# Patient Record
Sex: FEMALE | Race: WHITE | NOT HISPANIC OR LATINO | Employment: FULL TIME | URBAN - METROPOLITAN AREA
[De-identification: names, ages, dates, MRNs, and addresses within clinical notes are randomized per-mention and may not be internally consistent; named-entity substitution may affect disease eponyms.]

---

## 2017-12-27 ENCOUNTER — OFFICE VISIT (OUTPATIENT)
Dept: OBSTETRICS AND GYNECOLOGY | Age: 42
End: 2017-12-27

## 2017-12-27 VITALS
SYSTOLIC BLOOD PRESSURE: 118 MMHG | DIASTOLIC BLOOD PRESSURE: 80 MMHG | BODY MASS INDEX: 24.35 KG/M2 | WEIGHT: 164.4 LBS | HEIGHT: 69 IN

## 2017-12-27 DIAGNOSIS — Z11.51 SCREENING FOR HPV (HUMAN PAPILLOMAVIRUS): ICD-10-CM

## 2017-12-27 DIAGNOSIS — Z01.419 WELL WOMAN EXAM WITH ROUTINE GYNECOLOGICAL EXAM: Primary | ICD-10-CM

## 2017-12-27 PROCEDURE — 99386 PREV VISIT NEW AGE 40-64: CPT | Performed by: OBSTETRICS & GYNECOLOGY

## 2017-12-27 RX ORDER — ACYCLOVIR 800 MG/1
TABLET ORAL
Refills: 2 | COMMUNITY
Start: 2017-09-22 | End: 2019-07-18

## 2017-12-27 RX ORDER — SERTRALINE HYDROCHLORIDE 100 MG/1
100 TABLET, FILM COATED ORAL DAILY
COMMUNITY
End: 2018-09-24

## 2017-12-27 NOTE — PROGRESS NOTES
Chief complaint: annual/New gyn    Subjective   History of Present Illness    Christina Garcia is a 42 y.o.  who presents for annual exam. Moved back to [place] from [place]. Recently ,  has not had children and is interested in pregnancy. Pt not sure as children are grown and she is in her 40's. Currently has Mirena IUD (placed about 3 1/2 yrs ago). Menses q mo, last 5-7 days, fairly light with several days of spotting. Happy w/ IUD. H/o HSV and takes antivirals prn outbreak.  Soc Hx- sisters are my patients, pt works as a  ([employer]). - pharmacist at [employer]    Obstetric History:  OB History      Para Term  AB Living    3 3 3   3    SAB TAB Ectopic Multiple Live Births        3         Menstrual History:  Menarche age: 13 years  Patient's last menstrual period was 2017 (exact date).         Current contraception: IUD  History of abnormal Pap smear: no  Received Gardasil immunization: no  Perform regular self breast exam: yes -    Family history of uterine or ovarian cancer: no  Family History of colon cancer: yes - uncle in late 40's  Family history of breast cancer: no    Mammogram: up to date.  Colonoscopy: recommended.  DEXA: not indicated.    Exercise: moderately active  Calcium/Vitamin D: adequate intake    The following portions of the patient's history were reviewed and updated as appropriate: allergies, current medications, past family history, past medical history, past social history, past surgical history and problem list.    Review of Systems   Constitutional: Negative for activity change, fatigue, fever and unexpected weight change.   Respiratory: Negative for chest tightness and shortness of breath.    Cardiovascular: Negative for chest pain, palpitations and leg swelling.   Gastrointestinal: Negative for abdominal distention, abdominal pain, blood in stool, constipation, diarrhea, nausea and vomiting.   Endocrine: Negative  "for cold intolerance, heat intolerance, polydipsia, polyphagia and polyuria.   Genitourinary: Negative.    Musculoskeletal: Negative for arthralgias and back pain.   Skin: Negative for color change.   Neurological: Negative for weakness and headaches.   Hematological: Does not bruise/bleed easily.   Psychiatric/Behavioral: Negative for confusion.       Pertinent items are noted in HPI.     Objective   Physical Exam    /80  Ht 175.3 cm (69\")  Wt 74.6 kg (164 lb 6.4 oz)  LMP 12/12/2017 (Exact Date)  BMI 24.28 kg/m2    General:   alert, appears stated age and cooperative   Neck: no asymmetry, masses, or scars   Heart: regular rate and rhythm, S1, S2 normal, no murmur, click, rub or gallop   Lungs: clear to auscultation bilaterally   Abdomen: soft, non-tender, without masses or organomegaly   Breast: inspection negative, no nipple discharge or bleeding, no masses or nodularity palpable   Vulva: Bartholin's, Urethra, [place]'s normal   Vagina: normal mucosa   Cervix: multiparous appearance, no bleeding following Pap, no cervical motion tenderness, no lesions and IUD string noted   Uterus: normal size, mobile, non-tender, normal shape and consistency   Adnexa: normal adnexa and no mass, fullness, tenderness   Rectal: not indicated     Assessment/Plan   Christina was seen today for gynecologic exam.    Diagnoses and all orders for this visit:    Well woman exam with routine gynecological exam  -     PapIG, HPV, Rfx 16 / 18 - ThinPrep Vial, Cervix    Screening for HPV (human papillomavirus)  -     PapIG, HPV, Rfx 16 / 18 - ThinPrep Vial, Cervix        Breast self exam technique reviewed and patient encouraged to perform self-exam monthly.  Discussed healthy lifestyle modifications.  Pap smear sent    Discussed risks of pregnancy with AMA (would be 43 at time of delivery if became pregnant now) Discussed AMH testing             "

## 2017-12-29 LAB
CYTOLOGIST CVX/VAG CYTO: NORMAL
CYTOLOGY CVX/VAG DOC THIN PREP: NORMAL
DX ICD CODE: NORMAL
HIV 1 & 2 AB SER-IMP: NORMAL
HPV I/H RISK 1 DNA CVX QL PROBE+SIG AMP: NEGATIVE
OTHER STN SPEC: NORMAL
PATH REPORT.FINAL DX SPEC: NORMAL
STAT OF ADQ CVX/VAG CYTO-IMP: NORMAL

## 2018-01-03 ENCOUNTER — TELEPHONE (OUTPATIENT)
Dept: OBSTETRICS AND GYNECOLOGY | Age: 43
End: 2018-01-03

## 2018-08-11 ENCOUNTER — APPOINTMENT (OUTPATIENT)
Dept: CARDIOLOGY | Facility: HOSPITAL | Age: 43
End: 2018-08-11

## 2018-08-11 ENCOUNTER — APPOINTMENT (OUTPATIENT)
Dept: GENERAL RADIOLOGY | Facility: HOSPITAL | Age: 43
End: 2018-08-11

## 2018-08-11 ENCOUNTER — HOSPITAL ENCOUNTER (EMERGENCY)
Facility: HOSPITAL | Age: 43
Discharge: HOME | End: 2018-08-11
Attending: EMERGENCY MEDICINE | Admitting: EMERGENCY MEDICINE

## 2018-08-11 VITALS
TEMPERATURE: 98 F | BODY MASS INDEX: 24.44 KG/M2 | WEIGHT: 165 LBS | HEART RATE: 57 BPM | RESPIRATION RATE: 16 BRPM | DIASTOLIC BLOOD PRESSURE: 88 MMHG | OXYGEN SATURATION: 98 % | SYSTOLIC BLOOD PRESSURE: 127 MMHG | HEIGHT: 69 IN

## 2018-08-11 DIAGNOSIS — M79.605 LEFT LEG PAIN: Primary | ICD-10-CM

## 2018-08-11 LAB
ANION GAP SERPL CALCULATED.3IONS-SCNC: 10.4 MMOL/L
APTT PPP: 31.6 SECONDS (ref 22.7–35.4)
BASOPHILS # BLD AUTO: 0.02 10*3/MM3 (ref 0–0.2)
BASOPHILS NFR BLD AUTO: 0.3 % (ref 0–1.5)
BH CV LOWER VASCULAR LEFT COMMON FEMORAL AUGMENT: NORMAL
BH CV LOWER VASCULAR LEFT COMMON FEMORAL COMPETENT: NORMAL
BH CV LOWER VASCULAR LEFT COMMON FEMORAL COMPRESS: NORMAL
BH CV LOWER VASCULAR LEFT COMMON FEMORAL PHASIC: NORMAL
BH CV LOWER VASCULAR LEFT COMMON FEMORAL SPONT: NORMAL
BH CV LOWER VASCULAR LEFT DISTAL FEMORAL COMPRESS: NORMAL
BH CV LOWER VASCULAR LEFT GASTRONEMIUS COMPRESS: NORMAL
BH CV LOWER VASCULAR LEFT GREATER SAPH AK COMPRESS: NORMAL
BH CV LOWER VASCULAR LEFT GREATER SAPH BK COMPRESS: NORMAL
BH CV LOWER VASCULAR LEFT LESSER SAPH COMPRESS: NORMAL
BH CV LOWER VASCULAR LEFT MID FEMORAL AUGMENT: NORMAL
BH CV LOWER VASCULAR LEFT MID FEMORAL COMPETENT: NORMAL
BH CV LOWER VASCULAR LEFT MID FEMORAL COMPRESS: NORMAL
BH CV LOWER VASCULAR LEFT MID FEMORAL PHASIC: NORMAL
BH CV LOWER VASCULAR LEFT MID FEMORAL SPONT: NORMAL
BH CV LOWER VASCULAR LEFT PERONEAL COMPRESS: NORMAL
BH CV LOWER VASCULAR LEFT POPLITEAL AUGMENT: NORMAL
BH CV LOWER VASCULAR LEFT POPLITEAL COMPETENT: NORMAL
BH CV LOWER VASCULAR LEFT POPLITEAL COMPRESS: NORMAL
BH CV LOWER VASCULAR LEFT POPLITEAL PHASIC: NORMAL
BH CV LOWER VASCULAR LEFT POPLITEAL SPONT: NORMAL
BH CV LOWER VASCULAR LEFT POSTERIOR TIBIAL COMPRESS: NORMAL
BH CV LOWER VASCULAR LEFT PROXIMAL FEMORAL COMPRESS: NORMAL
BH CV LOWER VASCULAR LEFT SAPHENOFEMORAL JUNCTION AUGMENT: NORMAL
BH CV LOWER VASCULAR LEFT SAPHENOFEMORAL JUNCTION COMPETENT: NORMAL
BH CV LOWER VASCULAR LEFT SAPHENOFEMORAL JUNCTION COMPRESS: NORMAL
BH CV LOWER VASCULAR LEFT SAPHENOFEMORAL JUNCTION PHASIC: NORMAL
BH CV LOWER VASCULAR LEFT SAPHENOFEMORAL JUNCTION SPONT: NORMAL
BH CV LOWER VASCULAR RIGHT COMMON FEMORAL AUGMENT: NORMAL
BH CV LOWER VASCULAR RIGHT COMMON FEMORAL COMPETENT: NORMAL
BH CV LOWER VASCULAR RIGHT COMMON FEMORAL COMPRESS: NORMAL
BH CV LOWER VASCULAR RIGHT COMMON FEMORAL PHASIC: NORMAL
BH CV LOWER VASCULAR RIGHT COMMON FEMORAL SPONT: NORMAL
BUN BLD-MCNC: 18 MG/DL (ref 6–20)
BUN/CREAT SERPL: 22.8 (ref 7–25)
CALCIUM SPEC-SCNC: 10.1 MG/DL (ref 8.6–10.5)
CHLORIDE SERPL-SCNC: 102 MMOL/L (ref 98–107)
CK SERPL-CCNC: 77 U/L (ref 20–180)
CO2 SERPL-SCNC: 27.6 MMOL/L (ref 22–29)
CREAT BLD-MCNC: 0.79 MG/DL (ref 0.57–1)
DEPRECATED RDW RBC AUTO: 44.2 FL (ref 37–54)
EOSINOPHIL # BLD AUTO: 0.1 10*3/MM3 (ref 0–0.7)
EOSINOPHIL NFR BLD AUTO: 1.4 % (ref 0.3–6.2)
ERYTHROCYTE [DISTWIDTH] IN BLOOD BY AUTOMATED COUNT: 13.5 % (ref 11.7–13)
GFR SERPL CREATININE-BSD FRML MDRD: 79 ML/MIN/1.73
GLUCOSE BLD-MCNC: 90 MG/DL (ref 65–99)
HCT VFR BLD AUTO: 41.5 % (ref 35.6–45.5)
HGB BLD-MCNC: 13.6 G/DL (ref 11.9–15.5)
IMM GRANULOCYTES # BLD: 0.01 10*3/MM3 (ref 0–0.03)
IMM GRANULOCYTES NFR BLD: 0.1 % (ref 0–0.5)
INR PPP: 1.06 (ref 0.9–1.1)
LYMPHOCYTES # BLD AUTO: 2.25 10*3/MM3 (ref 0.9–4.8)
LYMPHOCYTES NFR BLD AUTO: 32.1 % (ref 19.6–45.3)
MCH RBC QN AUTO: 29.6 PG (ref 26.9–32)
MCHC RBC AUTO-ENTMCNC: 32.8 G/DL (ref 32.4–36.3)
MCV RBC AUTO: 90.2 FL (ref 80.5–98.2)
MONOCYTES # BLD AUTO: 0.46 10*3/MM3 (ref 0.2–1.2)
MONOCYTES NFR BLD AUTO: 6.6 % (ref 5–12)
NEUTROPHILS # BLD AUTO: 4.19 10*3/MM3 (ref 1.9–8.1)
NEUTROPHILS NFR BLD AUTO: 59.6 % (ref 42.7–76)
PLATELET # BLD AUTO: 206 10*3/MM3 (ref 140–500)
PMV BLD AUTO: 10.7 FL (ref 6–12)
POTASSIUM BLD-SCNC: 4.2 MMOL/L (ref 3.5–5.2)
PROTHROMBIN TIME: 13.6 SECONDS (ref 11.7–14.2)
RBC # BLD AUTO: 4.6 10*6/MM3 (ref 3.9–5.2)
SODIUM BLD-SCNC: 140 MMOL/L (ref 136–145)
WBC NRBC COR # BLD: 7.02 10*3/MM3 (ref 4.5–10.7)

## 2018-08-11 PROCEDURE — 82550 ASSAY OF CK (CPK): CPT | Performed by: PHYSICIAN ASSISTANT

## 2018-08-11 PROCEDURE — 99284 EMERGENCY DEPT VISIT MOD MDM: CPT

## 2018-08-11 PROCEDURE — 85025 COMPLETE CBC W/AUTO DIFF WBC: CPT | Performed by: EMERGENCY MEDICINE

## 2018-08-11 PROCEDURE — 85610 PROTHROMBIN TIME: CPT | Performed by: EMERGENCY MEDICINE

## 2018-08-11 PROCEDURE — 93971 EXTREMITY STUDY: CPT

## 2018-08-11 PROCEDURE — 80048 BASIC METABOLIC PNL TOTAL CA: CPT | Performed by: EMERGENCY MEDICINE

## 2018-08-11 PROCEDURE — 73552 X-RAY EXAM OF FEMUR 2/>: CPT

## 2018-08-11 PROCEDURE — 85730 THROMBOPLASTIN TIME PARTIAL: CPT | Performed by: EMERGENCY MEDICINE

## 2018-08-11 RX ORDER — NAPROXEN SODIUM 550 MG/1
550 TABLET ORAL 2 TIMES DAILY WITH MEALS
Qty: 20 TABLET | Refills: 0 | Status: SHIPPED | OUTPATIENT
Start: 2018-08-11 | End: 2019-09-11

## 2018-08-11 NOTE — ED NOTES
Pt states that she feels ok to discharge. Pt AA0x3, ambulatory to waiting room with ABCs intact. Steady gait. Prescriptions in hand.      Mirna Gonzalez RN  08/11/18 8817

## 2018-08-11 NOTE — ED NOTES
"PATIENT STATES \"PAIN STARTED 2 DAYS AGO, GOT WORSE YESTERDAY, UNEXPLAINED BRUISING TODAY\"     Deana Villalpando  08/11/18 [id number]    "

## 2018-08-11 NOTE — ED PROVIDER NOTES
EMERGENCY DEPARTMENT ENCOUNTER    Room Number:  25/25  Date seen:  8/12/2018  Time seen: 4:42 PM  PCP: System, Provider Not In  Historian: patient, family  History Limited By: N/A      HPI:  Chief Complaint: lower extremity pain  Context: Christina Garcia is a 43 y.o. female who states that she exercises on a regular basis and last exercised about 4 days ago (spin class). About 3 days ago, while she was going on a walk, she had onset of sharp and throbbing LLE pain from the left hip to the left knee. It is intermittent and is exacerbated by bearing weight on LLE and walking for an extended period. Today, she has noticed LLE swelling and unexplained LLE bruising. She notes that she has chronic lower back problems but her current sx do not seem similar to it. She denies recent known injury or trauma, new back pain, other extremity involvement, chest pain, dyspnea, focal weakness, numbness, trouble with speech, vision changes, headache, saddle anesthesia, abd pain, N/V/D, pain and difficulty with urination, bladder dysfunction, bowel dysfunction, fevers, chills, recent medication changes, and hx of clotting disorder. Pt has no other complaints at this time.     Location: LLE (from left hip to left knee)  Radiation: from left hip to left knee  Quality: sharp, throbbing  Intensity/Severity: moderate  Duration: started about 3 days ago  Onset quality: abrupt  Timing: intermittent  Progression: worse  Aggravating Factors: bearing weight on LLE, walking for prolonged period   Alleviating Factors: keeping weight off of LLE  Previous Episodes: none  Treatment before arrival: none mentioned  Associated Symptoms: LLE swelling, LLE bruising         PAST MEDICAL HISTORY  Active Ambulatory Problems     Diagnosis Date Noted   • No Active Ambulatory Problems     Resolved Ambulatory Problems     Diagnosis Date Noted   • No Resolved Ambulatory Problems     Past Medical History:   Diagnosis Date   • Anxiety    • Herpes          PAST  SURGICAL HISTORY  History reviewed. No pertinent surgical history.      FAMILY HISTORY  Family History   Problem Relation Age of Onset   • Colon cancer Paternal Uncle    • Breast cancer Neg Hx    • Ovarian cancer Neg Hx    • Uterine cancer Neg Hx          SOCIAL HISTORY  Social History     Social History   • Marital status:      Spouse name: N/A   • Number of children: N/A   • Years of education: N/A     Occupational History   • Not on file.     Social History Main Topics   • Smoking status: Former Smoker   • Smokeless tobacco: Never Used   • Alcohol use Yes      Comment: 0-2 drinks per week   • Drug use: No   • Sexual activity: Not on file     Other Topics Concern   • Not on file     Social History Narrative   • No narrative on file         ALLERGIES  Codeine      REVIEW OF SYSTEMS  Review of Systems   Constitutional: Negative for chills and fever.   HENT: Negative for congestion, rhinorrhea and sore throat.    Eyes: Negative for pain.   Respiratory: Negative for cough and shortness of breath.    Cardiovascular: Negative for chest pain and palpitations.   Gastrointestinal: Negative for abdominal pain, diarrhea, nausea and vomiting.   Endocrine: Negative.    Genitourinary: Negative for difficulty urinating and dysuria.   Musculoskeletal: Negative for myalgias. Back pain: chronic lower back problems, no new back pain.        LLE pain from left hip to left knee, LLE swelling   Skin: Positive for color change (LLE bruising).   Neurological: Negative for speech difficulty, weakness, numbness and headaches.   Psychiatric/Behavioral: Negative.    All other systems reviewed and are negative.           PHYSICAL EXAM  ED Triage Vitals   Temp Heart Rate Resp BP SpO2   08/11/18 1343 08/11/18 1343 08/11/18 1343 08/11/18 1412 08/11/18 1343   98.2 °F (36.8 °C) 59 16 127/82 100 % WNL      Temp src Heart Rate Source Patient Position BP Location FiO2 (%)   -- 08/11/18 1523 08/11/18 1412 08/11/18 1523 --    Monitor Sitting  Right arm        Physical Exam   Constitutional: She is oriented to person, place, and time. No distress.   HENT:   Head: Normocephalic.   Mouth/Throat: Mucous membranes are normal.   Eyes: Pupils are equal, round, and reactive to light. EOM are normal.   Neck: Normal range of motion. Neck supple.   Cardiovascular: Normal rate, regular rhythm and normal heart sounds.    Pulses:       Popliteal pulses are 2+ on the left side.        Dorsalis pedis pulses are 2+ on the left side.        Posterior tibial pulses are 2+ on the left side.   Pulmonary/Chest: Effort normal and breath sounds normal. No respiratory distress. She has no decreased breath sounds. She has no wheezes. She has no rhonchi. She has no rales.   Abdominal: Soft. There is no tenderness. There is no rebound and no guarding.   Musculoskeletal: Normal range of motion.   Questionable swelling to LLE compared to the RLE, no LLE tenderness, negative straight leg raise bilaterally, no l-spine tenderness, NV intact distally to LLE   Neurological: She is alert and oriented to person, place, and time. She has normal motor skills and normal sensation.   Sensation and motor function intact to LLE, nonfocal neuro exam   Skin: Skin is warm and dry. Bruising (3 bruises at different stages of healing to the left upper leg ) noted.   Psychiatric: Mood and affect normal.   Nursing note and vitals reviewed.          LAB RESULTS  Recent Results (from the past 24 hour(s))   Duplex Venous Lower Extremity - Left    Collection Time: 08/11/18  3:59 PM   Result Value Ref Range    Right Common Femoral Spont Y     Right Common Femoral Phasic Y     Right Common Femoral Augment Y     Right Common Femoral Competent Y     Right Common Femoral Compress C     Left Common Femoral Spont Y     Left Common Femoral Phasic Y     Left Common Femoral Augment Y     Left Common Femoral Competent Y     Left Common Femoral Compress C     Left Saphenofemoral Junction Spont Y     Left Saphenofemoral  Junction Phasic Y     Left Saphenofemoral [place] Augment Y     Left Saphenofemoral [place] Competent Y     Left Saphenofemoral [place] Compress C     Left Proximal Femoral Compress C     Left Mid Femoral Spont Y     Left Mid Femoral Phasic Y     Left Mid Femoral Augment Y     Left Mid Femoral Competent Y     Left Mid Femoral Compress C     Left Distal Femoral Compress C     Left Popliteal Spont Y     Left Popliteal Phasic Y     Left Popliteal Augment Y     Left Popliteal Competent Y     Left Popliteal Compress C     Left Posterior Tibial Compress C     Left Peroneal Compress C     Left GastronemiusSoleal Compress C     Left Greater Saph AK Compress C     Left Greater Saph BK Compress C     Left Lesser Saph Compress C    Protime-INR    Collection Time: 08/11/18  5:00 PM   Result Value Ref Range    Protime 13.6 11.7 - 14.2 Seconds    INR 1.06 0.90 - 1.10   aPTT    Collection Time: 08/11/18  5:00 PM   Result Value Ref Range    PTT 31.6 22.7 - 35.4 seconds   Basic Metabolic Panel    Collection Time: 08/11/18  5:00 PM   Result Value Ref Range    Glucose 90 65 - 99 mg/dL    BUN 18 6 - 20 mg/dL    Creatinine 0.79 0.57 - 1.00 mg/dL    Sodium 140 136 - 145 mmol/L    Potassium 4.2 3.5 - 5.2 mmol/L    Chloride 102 98 - 107 mmol/L    CO2 27.6 22.0 - 29.0 mmol/L    Calcium 10.1 8.6 - 10.5 mg/dL    eGFR Non African Amer 79 >60 mL/min/1.73    BUN/Creatinine Ratio 22.8 7.0 - 25.0    Anion Gap 10.4 mmol/L   CBC Auto Differential    Collection Time: 08/11/18  5:00 PM   Result Value Ref Range    WBC 7.02 4.50 - 10.70 10*3/mm3    RBC 4.60 3.90 - 5.20 10*6/mm3    Hemoglobin 13.6 11.9 - 15.5 g/dL    Hematocrit 41.5 35.6 - 45.5 %    MCV 90.2 80.5 - 98.2 fL    MCH 29.6 26.9 - 32.0 pg    MCHC 32.8 32.4 - 36.3 g/dL    RDW 13.5 (H) 11.7 - 13.0 %    RDW-SD 44.2 37.0 - 54.0 fl    MPV 10.7 6.0 - 12.0 fL    Platelets 206 140 - 500 10*3/mm3    Neutrophil % 59.6 42.7 - 76.0 %    Lymphocyte % 32.1 19.6 - 45.3 %    Monocyte % 6.6 5.0 - 12.0 %     Eosinophil % 1.4 0.3 - 6.2 %    Basophil % 0.3 0.0 - 1.5 %    Immature Grans % 0.1 0.0 - 0.5 %    Neutrophils, Absolute 4.19 1.90 - 8.10 10*3/mm3    Lymphocytes, Absolute 2.25 0.90 - 4.80 10*3/mm3    Monocytes, Absolute 0.46 0.20 - 1.20 10*3/mm3    Eosinophils, Absolute 0.10 0.00 - 0.70 10*3/mm3    Basophils, Absolute 0.02 0.00 - 0.20 10*3/mm3    Immature Grans, Absolute 0.01 0.00 - 0.03 10*3/mm3   CK    Collection Time: 08/11/18  5:00 PM   Result Value Ref Range    Creatine Kinase 77 20 - 180 U/L     LLE venous duplex (independently viewed by me, interpreted by vascular tech)- negative, no DVT    Ordered the above labs and reviewed the results.        RADIOLOGY  XR Femur 2 View Left (Final result)   Result time 08/11/18 18:43:05 (independently viewed by me, interpreted by radiologist)    Final result by Tacho Lang MD (08/11/18 18:43:05)                Narrative:    LEFT FEMUR X-RAYS     CLINICAL HISTORY: Left flank pain     5 views of the left femur were obtained. No bony abnormalities are  identified. There is no evidence of recent or old fracture or  subluxation. Hip joint appears normal. The knee joint is also  unremarkable.     This report was finalized on 8/11/2018 6:43 PM by Dr. Tacho Lang M.D.                       Ordered the above noted radiological studies. Reviewed by me in PACS.           PROCEDURES  Procedures          PROGRESS AND CONSULTS  ED Course as of Aug 12 0023   Sat Aug 11, 2018   1404 Left leg pain from groin to beyond knee.  Started 2 days ago.  She has several small bruises behind left knee.  Denies recent injury.  Did walk about 3 miles a few evenings ago but states this is usual for her.   [DB]      ED Course User Index  [DB] Tacho Novak MD     1645- LLE venous duplex has already been ordered for further evaluation. Ordered CK, blood work, and PT with INR for further evaluation.     1647- Discussed with pt and family about negative LLE venous duplex and plan to further  evaluate pt's sx by obtaining additional labs. Pt and family verbalize understanding and agreement with plan.     1753- Discussed case with Dr Perez who agrees with the plan of care.     1755- Rechecked pt. She is resting comfortably and is in no acute distress. Discussed with pt and family about normal WBC count, normal platelet count, normal CK, normal PTT, normal PT with INR, and otherwise stable labs. Informed pt that her pain could possibly be musculoskeletal in nature. Offered to perform left femur xray to rule out pathologic bony injury and pt accepts.     1815- Ordered left femur xray to rule out pathologic bony injury.     1900- Rechecked pt. She remains in no acute distress. Discussed with pt and family about left femur xray findings (no bony abnormality). Informed pt of plan to prescribe NSAID for pain. Advised pt to apply ice to areas of pain and to avoid exercising until her sx resolve. Instructed to f/u closely with PMD for recheck and for further testing/treatment as needed. RTER warnings given. Pt understands and agrees with plan. Addressed all questions.        MEDICAL DECISION MAKING      MDM  Number of Diagnoses or Management Options  Left leg pain:      Amount and/or Complexity of Data Reviewed  Clinical lab tests: ordered and reviewed (LLE venous duplex- negative, no DVT    WBC= 7.02, platelet count= 206, CK= 77, INR= 1.06, PTT= 31.6, BUN= 18, creatinine= 0.79)  Tests in the radiology section of CPT®: reviewed and ordered (Left femur xray- no bony abnormality )  Independent visualization of images, tracings, or specimens: yes    Patient Progress  Patient progress: stable             DIAGNOSIS  Final diagnoses:   Left leg pain         DISPOSITION  DISCHARGE    Patient discharged in stable condition.    Reviewed implications of results, diagnosis, meds, responsibility to follow up, warning signs and symptoms of possible worsening, potential complications and reasons to return to  ER.    Patient/Family voiced understanding of above instructions.    Discussed plan for discharge, as there is no emergent indication for admission.  Pt/family is agreeable and understands need for follow up and repeat testing.  Pt is aware that discharge does not mean that nothing is wrong but it indicates no emergency is present and they must continue care with follow-up as given below or physician of their choice.     FOLLOW-UP  Ana Cristina Ridley MD  [address] [id number] [phone]    In 5 days  For further evaluation and treatment if not better      DISCHARGE MEDICATIONS     Medication List      New Prescriptions    naproxen sodium 550 MG tablet  Commonly known as:  ANAPROX  Take 1 tablet by mouth 2 (Two) Times a Day With Meals.            Latest Documented Vital Signs:  As of 7:02 PM  BP- 136/91 HR- 56 Temp- 98.2 °F (36.8 °C) O2 sat- 98%      --  Documentation assistance provided by nikole Johnson for SUAD Dowd.  Information recorded by the scribe was done at my direction and has been verified and validated by me.       Mejia Johnson  08/11/18 1924       Chapo Dowd III, PA  08/12/18 0023     no

## 2018-08-11 NOTE — ED PROVIDER NOTES
Pt presents to the ED c/o posterior LLE pain that she describes a throb. Shortly after, she noticed a bruise to the area. Pain is exacerbated w/ walking but is able to ambulate on the extremity. Pt works out routinely but has not recently due to pain. She denies CP, SOA, and leg swelling. On exam, Pt is awake and alert in no acute distress, RRR w/o murmur, CTAB, abd soft non-tender, non-distended, no midline tenderness, tenderness to the lateral upper left thigh, proximal left thigh has a healing bruise, healing bruise to popliteal fossa, tenderness over the hamstrings distally, pain in area is exacerbated w/ SLR of LLE.    LAB RESULTS AND RADIOLOGY  I have reviewed the patient's labs and imaging studies.    PROCEDURE    PROGRESS NOTES  1800  Spoke to midlevel provider Chapo Dowd PA-C, about the pt.     1812  After performing my own physical exam, I agree w/ the plan of care.    Attestation:    The FRANCI and I have discussed this patient's history, physical exam, and treatment plan.  I have reviewed the documentation and personally had a face to face interaction with the patient. I affirm the documentation and agree with the treatment and plan.  The attached note describes my personal findings.    Documentation assistance provided by nikole Corona for Dr. Perez. Information recorded by the nikole was done at my direction and has been verified and validated by me.     Mary Corona  08/11/18 1813       Samuel Perez MD  08/11/18 7298

## 2018-08-11 NOTE — DISCHARGE INSTRUCTIONS
Return to the ER with any further concerns, should your condition change/worsen, or should you develop a fever.  Avoid working out until symptoms resolve.

## 2018-09-24 ENCOUNTER — TELEPHONE (OUTPATIENT)
Dept: OBSTETRICS AND GYNECOLOGY | Age: 43
End: 2018-09-24

## 2018-09-24 DIAGNOSIS — F41.3 OTHER MIXED ANXIETY DISORDERS: ICD-10-CM

## 2018-09-24 RX ORDER — SERTRALINE HYDROCHLORIDE 100 MG/1
200 TABLET, FILM COATED ORAL DAILY
Qty: 180 TABLET | Refills: 0 | Status: SHIPPED | OUTPATIENT
Start: 2018-09-24 | End: 2018-10-08 | Stop reason: SDUPTHER

## 2018-09-24 NOTE — TELEPHONE ENCOUNTER
Dr Stokes pt move here from [facility], cannot get in with pcp til October the 8th, needs a month of Zoloft 100 mg BID sent in [facility] able to see pcp, the pcp in [facility] will not call in for her.

## 2019-07-18 ENCOUNTER — PROCEDURE VISIT (OUTPATIENT)
Dept: OBSTETRICS AND GYNECOLOGY | Age: 44
End: 2019-07-18

## 2019-07-18 ENCOUNTER — APPOINTMENT (OUTPATIENT)
Dept: WOMENS IMAGING | Facility: HOSPITAL | Age: 44
End: 2019-07-18

## 2019-07-18 ENCOUNTER — OFFICE VISIT (OUTPATIENT)
Dept: OBSTETRICS AND GYNECOLOGY | Age: 44
End: 2019-07-18

## 2019-07-18 VITALS
DIASTOLIC BLOOD PRESSURE: 60 MMHG | SYSTOLIC BLOOD PRESSURE: 100 MMHG | WEIGHT: 169 LBS | HEIGHT: 69 IN | BODY MASS INDEX: 25.03 KG/M2

## 2019-07-18 DIAGNOSIS — Z97.5 IUD CONTRACEPTION: ICD-10-CM

## 2019-07-18 DIAGNOSIS — B00.9 HSV (HERPES SIMPLEX VIRUS) INFECTION: ICD-10-CM

## 2019-07-18 DIAGNOSIS — Z01.419 WELL WOMAN EXAM WITH ROUTINE GYNECOLOGICAL EXAM: Primary | ICD-10-CM

## 2019-07-18 DIAGNOSIS — Z12.31 VISIT FOR SCREENING MAMMOGRAM: Primary | ICD-10-CM

## 2019-07-18 PROCEDURE — 77067 SCR MAMMO BI INCL CAD: CPT | Performed by: RADIOLOGY

## 2019-07-18 PROCEDURE — 99396 PREV VISIT EST AGE 40-64: CPT | Performed by: OBSTETRICS & GYNECOLOGY

## 2019-07-18 PROCEDURE — 77067 SCR MAMMO BI INCL CAD: CPT | Performed by: OBSTETRICS & GYNECOLOGY

## 2019-07-18 RX ORDER — MISOPROSTOL 200 UG/1
TABLET ORAL
Qty: 2 TABLET | Refills: 0 | Status: SHIPPED | OUTPATIENT
Start: 2019-07-18 | End: 2020-09-11

## 2019-07-18 RX ORDER — VALACYCLOVIR HYDROCHLORIDE 500 MG/1
TABLET, FILM COATED ORAL
Qty: 18 TABLET | Refills: 0 | Status: SHIPPED | OUTPATIENT
Start: 2019-07-18 | End: 2022-02-28

## 2019-07-18 NOTE — PROGRESS NOTES
Chief complaint: annual    Subjective   History of Present Illness    Christina Garcia is a 44 y.o.  who presents for annual exam.  Her menses are irregular with Mirena. Has had Mirena for about 6 years. Was placed in [place]. Desires removal and re-insertion. Has rare outbreaks of HSV. Takes acyclovir prn but will try valtrex again.   2017 pap normal, HPV-  Soc Hx-   Needs MMG today    Obstetric History:  OB History      Para Term  AB Living    3 3 3     3    SAB TAB Ectopic Molar Multiple Live Births              3         Menstrual History:     Patient's last menstrual period was 2019 (exact date).         Current contraception: IUD  History of abnormal Pap smear: no  Received Gardasil immunization: no  Perform regular self breast exam: yes -    Family history of uterine or ovarian cancer: no  Family History of colon cancer: yes - GF  Family history of breast cancer: no    Mammogram: done today.  Colonoscopy: recommended. Next year (age 45)  DEXA: not indicated.    Exercise: moderately active  Calcium/Vitamin D: adequate intake    The following portions of the patient's history were reviewed and updated as appropriate: allergies, current medications, past family history, past medical history, past social history, past surgical history and problem list.    Review of Systems   Constitutional: Negative for activity change, fatigue, fever and unexpected weight change.   Respiratory: Negative for chest tightness and shortness of breath.    Cardiovascular: Negative for chest pain, palpitations and leg swelling.   Gastrointestinal: Negative for abdominal distention, abdominal pain, blood in stool, constipation, diarrhea, nausea and vomiting.   Endocrine: Negative for cold intolerance, heat intolerance, polydipsia, polyphagia and polyuria.   Genitourinary: Positive for menstrual problem.   Musculoskeletal: Negative for arthralgias and back pain.   Skin: Negative for color  "change.   Neurological: Negative for weakness and headaches.   Hematological: Does not bruise/bleed easily.   Psychiatric/Behavioral: Negative for confusion.       Pertinent items are noted in HPI.     Objective   Physical Exam    /60   Ht 175.3 cm (69\")   Wt 76.7 kg (169 lb)   LMP 07/07/2019 (Exact Date)   Breastfeeding? No   BMI 24.96 kg/m²     General:   alert, appears stated age and cooperative   Neck: no asymmetry, masses, or scars   Heart: regular rate and rhythm, S1, S2 normal, no murmur, click, rub or gallop   Lungs: clear to auscultation bilaterally   Abdomen: soft, non-tender, without masses or organomegaly   Breast: inspection negative, no nipple discharge or bleeding, no masses or nodularity palpable   Vulva: Bartholin's, Urethra, [place]'s normal   Vagina: normal mucosa   Cervix: multiparous appearance and no cervical motion tenderness, IUD string noted   Uterus: normal size, anteverted, mobile, non-tender, normal shape and consistency   Adnexa: normal adnexa and no mass, fullness, tenderness   Rectal: not indicated     Assessment/Plan   Christina was seen today for gynecologic exam.    Diagnoses and all orders for this visit:    Well woman exam with routine gynecological exam    IUD contraception  -     misoprostol (CYTOTEC) 200 MCG tablet; 2 tab po night before IUD insertion    HSV (herpes simplex virus) infection  -     valACYclovir (VALTREX) 500 MG tablet; 1 po bid for 3 days prn outbreaks    pre-treat w/ cytotec the night before IUD placement as may not be on menses  Will order MIrena today and schedule removal and replacement    Breast self exam technique reviewed and patient encouraged to perform self-exam monthly.  Discussed healthy lifestyle modifications.  Pap smear up to date, plan q 3 yrs                 "

## 2019-09-11 ENCOUNTER — PROCEDURE VISIT (OUTPATIENT)
Dept: OBSTETRICS AND GYNECOLOGY | Age: 44
End: 2019-09-11

## 2019-09-11 ENCOUNTER — OFFICE VISIT (OUTPATIENT)
Dept: OBSTETRICS AND GYNECOLOGY | Age: 44
End: 2019-09-11

## 2019-09-11 VITALS
WEIGHT: 163.2 LBS | SYSTOLIC BLOOD PRESSURE: 126 MMHG | DIASTOLIC BLOOD PRESSURE: 82 MMHG | HEIGHT: 69 IN | BODY MASS INDEX: 24.17 KG/M2

## 2019-09-11 DIAGNOSIS — Z30.430 ENCOUNTER FOR IUD INSERTION: Primary | ICD-10-CM

## 2019-09-11 DIAGNOSIS — Z01.812 PRE-PROCEDURE LAB EXAM: ICD-10-CM

## 2019-09-11 PROCEDURE — 58301 REMOVE INTRAUTERINE DEVICE: CPT | Performed by: OBSTETRICS & GYNECOLOGY

## 2019-09-11 PROCEDURE — 58300 INSERT INTRAUTERINE DEVICE: CPT | Performed by: OBSTETRICS & GYNECOLOGY

## 2019-09-11 PROCEDURE — 76830 TRANSVAGINAL US NON-OB: CPT | Performed by: OBSTETRICS & GYNECOLOGY

## 2019-09-11 PROCEDURE — 81025 URINE PREGNANCY TEST: CPT | Performed by: OBSTETRICS & GYNECOLOGY

## 2019-09-11 RX ORDER — ACYCLOVIR 800 MG/1
TABLET ORAL
COMMUNITY
Start: 2017-06-26 | End: 2020-09-11 | Stop reason: SDUPTHER

## 2019-09-12 LAB
B-HCG UR QL: NEGATIVE
INTERNAL NEGATIVE CONTROL: NEGATIVE
INTERNAL POSITIVE CONTROL: POSITIVE
Lab: NORMAL

## 2019-09-12 NOTE — PROGRESS NOTES
IUD Insertion Procedure Note    Pre-operative Diagnosis: Mirena , desires new Mirena for contraception    Post-operative Diagnosis: same    Indications: contraception    Procedure Details   Urine pregnancy test was done   and result was negative.  The risks (including infection, bleeding, pain, and uterine perforation) and benefits of the procedure were explained to the patient and Verbal and written informed consent was obtained.  Risks/benefits reviewed. Old Mirena removed easily with ring forceps.    Cervix cleansed with Betadine. Uterus sounded to 6 cm. IUD inserted without difficulty. String visible and trimmed.    IUD Information:  Mirena.    Condition:  Stable    Complications:  None  Patient tolerated the procedure well without complications.    Plan:    The patient was advised to call for any fever or for prolonged or severe pain or bleeding. She was advised to use OTC ibuprofen as needed for mild to moderate pain.     Attending Physician Documentation:  I was present for or participated in the entire procedure, including opening and closing.    US confirmed proper placement in endometrial cavity  F/u IUD check in 6 weeks

## 2019-10-09 ENCOUNTER — OFFICE VISIT (OUTPATIENT)
Dept: OBSTETRICS AND GYNECOLOGY | Age: 44
End: 2019-10-09

## 2019-10-09 VITALS
WEIGHT: 162.2 LBS | BODY MASS INDEX: 24.02 KG/M2 | DIASTOLIC BLOOD PRESSURE: 60 MMHG | HEIGHT: 69 IN | SYSTOLIC BLOOD PRESSURE: 100 MMHG

## 2019-10-09 DIAGNOSIS — Z30.431 IUD CHECK UP: Primary | ICD-10-CM

## 2019-10-09 PROCEDURE — 99212 OFFICE O/P EST SF 10 MIN: CPT | Performed by: PHYSICIAN ASSISTANT

## 2019-10-09 NOTE — PROGRESS NOTES
"Subjective     Chief Complaint   Patient presents with   • Follow-up     IUD check       Christina Garcia is a 44 y.o.  whose LMP is No LMP recorded. Patient is not currently having periods (Reason: Other). presents for her IUD check  She is doing ok  Had bleeding for 3 wks after placement of her IUD  She stopped a week ago  She has not checked strings  She has a h/o IUD and doesn't recall bleeding like this  She is willing to wait it out  Denies risk of pregnancy as hasn't been SA since the IUD was place d/t her bleeding    No Additional Complaints Reported    The following portions of the patient's history were reviewed and updated as appropriate:vital signs, allergies, current medications, past family history, past medical history, past social history, past surgical history and problem list      Review of Systems   Genitourinary:iud check     Objective      /60   Ht 175.3 cm (69\")   Wt 73.6 kg (162 lb 3.2 oz)   Breastfeeding? No   BMI 23.95 kg/m²     Physical Exam    General:   alert, comfortable and no distress   Heart: Not performed today   Lungs: Not performed today.   Breast: Not performed today   Neck: na   Abdomen: {Not performed today   CVA: Not performed today   Pelvis: External genitalia: normal general appearance  Vaginal: normal mucosa without prolapse or lesions  Cervix: IUD string visualized   Extremities: Not performed today   Neurologic: negative   Psychiatric: Normal affect, judgement, and mood       Lab Review   Labs: No data reviewed     Imaging   No data reviewed    Assessment/Plan     ASSESSMENT  1. IUD check up        PLAN  1. IUD strings seen and are appropriate. Will c/w monitoring her bleeding, not uncommon to have irregular bleeding for first 3 months of use. Enc patience but call back if she would like it removed    Follow up: 1 year(s)    SUAD Clifford  10/9/2019           "

## 2020-09-11 ENCOUNTER — OFFICE VISIT (OUTPATIENT)
Dept: OBSTETRICS AND GYNECOLOGY | Age: 45
End: 2020-09-11

## 2020-09-11 ENCOUNTER — PROCEDURE VISIT (OUTPATIENT)
Dept: OBSTETRICS AND GYNECOLOGY | Age: 45
End: 2020-09-11

## 2020-09-11 VITALS
DIASTOLIC BLOOD PRESSURE: 64 MMHG | HEIGHT: 69 IN | SYSTOLIC BLOOD PRESSURE: 122 MMHG | WEIGHT: 150.6 LBS | BODY MASS INDEX: 22.31 KG/M2

## 2020-09-11 DIAGNOSIS — Z30.431 IUD CHECK UP: Primary | ICD-10-CM

## 2020-09-11 DIAGNOSIS — N93.9 ABNORMAL UTERINE BLEEDING (AUB): ICD-10-CM

## 2020-09-11 DIAGNOSIS — Z30.432 ENCOUNTER FOR IUD REMOVAL: ICD-10-CM

## 2020-09-11 DIAGNOSIS — N92.6 IRREGULAR MENSES: Primary | ICD-10-CM

## 2020-09-11 DIAGNOSIS — N92.1 MENORRHAGIA WITH IRREGULAR CYCLE: ICD-10-CM

## 2020-09-11 PROCEDURE — 58301 REMOVE INTRAUTERINE DEVICE: CPT | Performed by: OBSTETRICS & GYNECOLOGY

## 2020-09-11 PROCEDURE — 99213 OFFICE O/P EST LOW 20 MIN: CPT | Performed by: OBSTETRICS & GYNECOLOGY

## 2020-09-11 PROCEDURE — 76830 TRANSVAGINAL US NON-OB: CPT | Performed by: OBSTETRICS & GYNECOLOGY

## 2020-09-11 NOTE — PROGRESS NOTES
"Subjective     Chief Complaint   Patient presents with   • Follow-up     Gyn follow up, Irregular periods, US today       Christina Garcia is a 45 y.o.  whose LMP is No LMP recorded (lmp unknown). Patient has had an implant. presents with continuous bleeding. C/o spotting to bleeding like a period. No pain or cramping. IUD was placed 1 yr ago (2019)and US at that visit noted IUD in proper place. Pt desires IUD removal. Plans vasectomy for contraception.       No Additional Complaints Reported    The following portions of the patient's history were reviewed and updated as appropriate:vital signs, allergies, current medications, past family history, past medical history, past social history, past surgical history and problem list      Review of Systems   A comprehensive review of systems was negative except for: abnormal uterine bleeding       Objective      /64   Ht 175.3 cm (69\")   Wt 68.3 kg (150 lb 9.6 oz)   LMP  (LMP Unknown) Comment: Mirena 19  Breastfeeding No   BMI 22.24 kg/m²     Physical Exam    General:   alert, appears stated age and no distress   Heart:    Lungs:    Breast:    Neck:    Abdomen:    CVA:    Pelvis: External genitalia: normal general appearance  Urinary system: urethral meatus normal  Vaginal: normal mucosa without prolapse or lesions  Cervix: normal appearance, IUD string noted   Extremities: Extremities normal, atraumatic, no cyanosis or edema   Neurologic: negative   Psychiatric: Normal affect, judgement, and mood       Lab Review   Labs: No data reviewed     Imaging   Ultrasound - Pelvic Vaginal  Uterus 7 x 4.6 x 4. 5 cm, EML 9 mm, IUD in lower uterine segment, normal ovaries    Assessment/Plan     ASSESSMENT  1. Irregular menses    2. Encounter for IUD removal    3. Menorrhagia with irregular cycle    IUD out of place in lower uterine segment    PLAN  1. No orders of the defined types were placed in this encounter.      2. Medications prescribed this encounter:    "   No orders of the defined types were placed in this encounter.      3. IUD removed easily with ring forceps. Removed intact. Pt tolerated well. IUD was very low in uterus.  4. Pt may consider cyclic progesterone, progesterone only pills or Novasure endometrial ablation if has menorrhagia once normal menses resume. We reviewed risks/benefits of Novasure and info given    Follow up: for annual w/ MMG and pap    Miranda Byrd MD  9/11/2020

## 2020-11-19 ENCOUNTER — OFFICE VISIT (OUTPATIENT)
Dept: OBSTETRICS AND GYNECOLOGY | Age: 45
End: 2020-11-19

## 2020-11-19 ENCOUNTER — APPOINTMENT (OUTPATIENT)
Dept: WOMENS IMAGING | Facility: HOSPITAL | Age: 45
End: 2020-11-19

## 2020-11-19 ENCOUNTER — PROCEDURE VISIT (OUTPATIENT)
Dept: OBSTETRICS AND GYNECOLOGY | Age: 45
End: 2020-11-19

## 2020-11-19 VITALS
BODY MASS INDEX: 22.1 KG/M2 | HEIGHT: 69 IN | SYSTOLIC BLOOD PRESSURE: 118 MMHG | DIASTOLIC BLOOD PRESSURE: 60 MMHG | WEIGHT: 149.2 LBS

## 2020-11-19 DIAGNOSIS — Z11.51 ENCOUNTER FOR SCREENING FOR HUMAN PAPILLOMAVIRUS (HPV): ICD-10-CM

## 2020-11-19 DIAGNOSIS — Z12.11 COLON CANCER SCREENING: ICD-10-CM

## 2020-11-19 DIAGNOSIS — Z01.419 WELL WOMAN EXAM WITH ROUTINE GYNECOLOGICAL EXAM: Primary | ICD-10-CM

## 2020-11-19 DIAGNOSIS — N92.6 IRREGULAR MENSES: ICD-10-CM

## 2020-11-19 DIAGNOSIS — Z12.31 VISIT FOR SCREENING MAMMOGRAM: Primary | ICD-10-CM

## 2020-11-19 PROCEDURE — 77067 SCR MAMMO BI INCL CAD: CPT | Performed by: OBSTETRICS & GYNECOLOGY

## 2020-11-19 PROCEDURE — 99396 PREV VISIT EST AGE 40-64: CPT | Performed by: OBSTETRICS & GYNECOLOGY

## 2020-11-19 PROCEDURE — 77067 SCR MAMMO BI INCL CAD: CPT | Performed by: RADIOLOGY

## 2020-11-19 NOTE — PROGRESS NOTES
"Chief complaint: annual    Subjective   History of Present Illness    Christina Garcia is a 45 y.o.  who presents for annual exam. No c/o. Still w/ irregular menses. Mirena IUD removed 2020 after US noted that it was in the MARTI  Pt presented w/ irregular bleeding. Uterus was otherwise wnl. She desires observation to see if they will normalize on their own. Declines ocps at this time.  scheduled for vasectomy. Using condoms now.  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         Current contraception: condoms  History of abnormal Pap smear: no  Received Gardasil immunization: no  Perform regular self breast exam: yes -    Family history of uterine or ovarian cancer: no  Family History of colon cancer: yes - paternal uncle  Family history of breast cancer: no    Mammogram: done today.  Colonoscopy: recommended.  DEXA: not indicated.    Exercise: moderately active  Calcium/Vitamin D: adequate intake    The following portions of the patient's history were reviewed and updated as appropriate: allergies, current medications, past family history, past medical history, past social history, past surgical history and problem list.    Review of Systems   Constitutional: Negative.    Genitourinary: Positive for menstrual problem.       Pertinent items are noted in HPI.     Objective   Physical Exam    /60   Ht 175.3 cm (69\")   Wt 67.7 kg (149 lb 3.2 oz)   LMP 2020 (Approximate)   Breastfeeding No   BMI 22.03 kg/m²     General:   alert, appears stated age and cooperative   Neck: no asymmetry, masses, or scars   Heart: regular " rate and rhythm, S1, S2 normal, no murmur, click, rub or gallop   Lungs: clear to auscultation bilaterally   Abdomen: soft, non-tender, without masses or organomegaly   Breast: inspection negative, no nipple discharge or bleeding, no masses or nodularity palpable   Vulva: normal, Bartholin's, Urethra, [place]'s normal   Vagina: normal mucosa   Cervix: multiparous appearance, no bleeding following Pap and no cervical motion tenderness   Uterus: normal size, mobile, non-tender, normal shape and consistency, retroverted   Adnexa: normal adnexa and no mass, fullness, tenderness   Rectal: not indicated     Assessment/Plan   Diagnoses and all orders for this visit:    1. Well woman exam with routine gynecological exam (Primary)    2. Irregular menses    3. Colon cancer screening    3. HSV- valtrex prn, has rx    Breast self exam technique reviewed and patient encouraged to perform self-exam monthly.  Discussed healthy lifestyle modifications.  Pap smear sent    Consider cyclic prometrium if menses do not regulate after a few months. Irregular menses thought to be due to Mirena IUD in wrong place.       Recommend colonoscopy for colon cancer screening starting age 45. Pt will check w/ insurance co to see if it would be covered

## 2020-11-24 ENCOUNTER — TELEPHONE (OUTPATIENT)
Dept: OBSTETRICS AND GYNECOLOGY | Age: 45
End: 2020-11-24

## 2020-11-24 LAB
CYTOLOGIST CVX/VAG CYTO: NORMAL
CYTOLOGY CVX/VAG DOC CYTO: NORMAL
CYTOLOGY CVX/VAG DOC THIN PREP: NORMAL
DX ICD CODE: NORMAL
HIV 1 & 2 AB SER-IMP: NORMAL
HPV I/H RISK 1 DNA CVX QL PROBE+SIG AMP: NEGATIVE
OTHER STN SPEC: NORMAL
STAT OF ADQ CVX/VAG CYTO-IMP: NORMAL

## 2021-01-20 ENCOUNTER — PREP FOR SURGERY (OUTPATIENT)
Dept: OTHER | Facility: HOSPITAL | Age: 46
End: 2021-01-20

## 2021-01-20 DIAGNOSIS — Z80.0 FAMILY HISTORY OF COLON CANCER IN MOTHER: ICD-10-CM

## 2021-01-20 DIAGNOSIS — Z12.11 SCREENING FOR COLON CANCER: Primary | ICD-10-CM

## 2021-01-20 DIAGNOSIS — Z83.71 FAMILY HISTORY OF POLYPS IN THE COLON: ICD-10-CM

## 2021-01-21 PROBLEM — Z80.0 FAMILY HISTORY OF COLON CANCER IN MOTHER: Status: ACTIVE | Noted: 2021-01-21

## 2021-01-21 PROBLEM — Z83.71 FAMILY HISTORY OF POLYPS IN THE COLON: Status: ACTIVE | Noted: 2021-01-21

## 2021-01-21 PROBLEM — Z12.11 SCREENING FOR COLON CANCER: Status: ACTIVE | Noted: 2021-01-21

## 2021-01-21 PROBLEM — Z83.719 FAMILY HISTORY OF POLYPS IN THE COLON: Status: ACTIVE | Noted: 2021-01-21

## 2021-02-10 ENCOUNTER — IMMUNIZATION (OUTPATIENT)
Dept: VACCINE CLINIC | Facility: HOSPITAL | Age: 46
End: 2021-02-10

## 2021-02-10 PROCEDURE — 91300 HC SARSCOV02 VAC 30MCG/0.3ML IM: CPT | Performed by: INTERNAL MEDICINE

## 2021-02-10 PROCEDURE — 0001A: CPT | Performed by: INTERNAL MEDICINE

## 2021-02-18 PROBLEM — M75.22 BICEPS TENDINITIS, LEFT: Status: ACTIVE | Noted: 2020-05-18

## 2021-02-18 PROBLEM — L30.9 ECZEMA: Status: ACTIVE | Noted: 2021-02-18

## 2021-02-18 PROBLEM — M75.42 SHOULDER IMPINGEMENT SYNDROME, LEFT: Status: ACTIVE | Noted: 2020-05-18

## 2021-02-18 PROBLEM — M75.111 NONTRAUMATIC INCOMPLETE TEAR OF RIGHT ROTATOR CUFF: Status: ACTIVE | Noted: 2020-05-18

## 2021-02-18 PROBLEM — M19.012 ARTHRITIS OF LEFT ACROMIOCLAVICULAR JOINT: Status: ACTIVE | Noted: 2020-05-18

## 2021-02-18 PROBLEM — M25.519 ACROMIOCLAVICULAR JOINT PAIN: Status: ACTIVE | Noted: 2020-05-18

## 2021-02-18 PROBLEM — A60.00 GENITAL HSV: Status: ACTIVE | Noted: 2021-02-18

## 2021-02-18 PROBLEM — F41.9 ANXIETY: Status: ACTIVE | Noted: 2021-02-18

## 2021-02-22 ENCOUNTER — TRANSCRIBE ORDERS (OUTPATIENT)
Dept: ADMINISTRATIVE | Facility: HOSPITAL | Age: 46
End: 2021-02-22

## 2021-02-22 DIAGNOSIS — Z01.818 OTHER SPECIFIED PRE-OPERATIVE EXAMINATION: Primary | ICD-10-CM

## 2021-02-23 ENCOUNTER — TRANSCRIBE ORDERS (OUTPATIENT)
Dept: SLEEP MEDICINE | Facility: HOSPITAL | Age: 46
End: 2021-02-23

## 2021-02-23 ENCOUNTER — LAB (OUTPATIENT)
Dept: LAB | Facility: HOSPITAL | Age: 46
End: 2021-02-23

## 2021-02-23 DIAGNOSIS — Z01.818 OTHER SPECIFIED PRE-OPERATIVE EXAMINATION: Primary | ICD-10-CM

## 2021-02-23 DIAGNOSIS — Z01.818 OTHER SPECIFIED PRE-OPERATIVE EXAMINATION: ICD-10-CM

## 2021-02-23 PROCEDURE — U0004 COV-19 TEST NON-CDC HGH THRU: HCPCS | Performed by: INTERNAL MEDICINE

## 2021-02-23 PROCEDURE — C9803 HOPD COVID-19 SPEC COLLECT: HCPCS | Performed by: INTERNAL MEDICINE

## 2021-02-24 LAB — SARS-COV-2 RNA RESP QL NAA+PROBE: NOT DETECTED

## 2021-02-25 ENCOUNTER — ANESTHESIA (OUTPATIENT)
Dept: GASTROENTEROLOGY | Facility: HOSPITAL | Age: 46
End: 2021-02-25

## 2021-02-25 ENCOUNTER — HOSPITAL ENCOUNTER (OUTPATIENT)
Facility: HOSPITAL | Age: 46
Setting detail: HOSPITAL OUTPATIENT SURGERY
Discharge: HOME | End: 2021-02-25
Attending: COLON & RECTAL SURGERY | Admitting: COLON & RECTAL SURGERY

## 2021-02-25 ENCOUNTER — ANESTHESIA EVENT (OUTPATIENT)
Dept: GASTROENTEROLOGY | Facility: HOSPITAL | Age: 46
End: 2021-02-25

## 2021-02-25 VITALS
OXYGEN SATURATION: 97 % | DIASTOLIC BLOOD PRESSURE: 56 MMHG | HEIGHT: 69 IN | BODY MASS INDEX: 22.36 KG/M2 | RESPIRATION RATE: 16 BRPM | WEIGHT: 151 LBS | SYSTOLIC BLOOD PRESSURE: 102 MMHG | HEART RATE: 56 BPM

## 2021-02-25 PROCEDURE — 81025 URINE PREGNANCY TEST: CPT | Performed by: COLON & RECTAL SURGERY

## 2021-02-25 PROCEDURE — 45378 DIAGNOSTIC COLONOSCOPY: CPT | Performed by: COLON & RECTAL SURGERY

## 2021-02-25 PROCEDURE — 25010000002 PROPOFOL 10 MG/ML EMULSION: Performed by: ANESTHESIOLOGY

## 2021-02-25 RX ORDER — PROPOFOL 10 MG/ML
VIAL (ML) INTRAVENOUS AS NEEDED
Status: DISCONTINUED | OUTPATIENT
Start: 2021-02-25 | End: 2021-02-25 | Stop reason: SURG

## 2021-02-25 RX ORDER — SODIUM CHLORIDE, SODIUM LACTATE, POTASSIUM CHLORIDE, CALCIUM CHLORIDE 600; 310; 30; 20 MG/100ML; MG/100ML; MG/100ML; MG/100ML
1000 INJECTION, SOLUTION INTRAVENOUS CONTINUOUS
Status: DISCONTINUED | OUTPATIENT
Start: 2021-02-25 | End: 2021-02-25 | Stop reason: HOSPADM

## 2021-02-25 RX ORDER — PROPOFOL 10 MG/ML
VIAL (ML) INTRAVENOUS CONTINUOUS PRN
Status: DISCONTINUED | OUTPATIENT
Start: 2021-02-25 | End: 2021-02-25 | Stop reason: SURG

## 2021-02-25 RX ADMIN — SODIUM CHLORIDE, POTASSIUM CHLORIDE, SODIUM LACTATE AND CALCIUM CHLORIDE 1000 ML: 600; 310; 30; 20 INJECTION, SOLUTION INTRAVENOUS at 12:59

## 2021-02-25 RX ADMIN — PROPOFOL 150 MG: 10 INJECTION, EMULSION INTRAVENOUS at 13:03

## 2021-02-25 RX ADMIN — PROPOFOL 160 MCG/KG/MIN: 10 INJECTION, EMULSION INTRAVENOUS at 13:03

## 2021-02-25 NOTE — ANESTHESIA POSTPROCEDURE EVALUATION
Patient: Christina Garcia    Procedure Summary     Date: 02/25/21 Room / Location: [facility] ENDOSCOPY 9 / [facility] ENDOSCOPY    Anesthesia Start: 1300 Anesthesia Stop: 1324    Procedure: COLONOSCOPY TO CECUM (N/A ) Diagnosis:       Screening for colon cancer      Family history of colon cancer in mother      Family history of polyps in the colon      (Screening for colon cancer [Z12.11])      (Family history of colon cancer in mother [Z80.0])      (Family history of polyps in the colon [Z83.71])    Surgeon: Levi Lambert MD Provider: Brian Tompkins MD    Anesthesia Type: MAC ASA Status: 2          Anesthesia Type: MAC    Vitals  Vitals Value Taken Time   BP 97/65 02/25/21 1326   Temp     Pulse 48 02/25/21 1326   Resp 14 02/25/21 1326   SpO2 99 % 02/25/21 1326           Post Anesthesia Care and Evaluation    Patient location during evaluation: PHASE II  Patient participation: complete - patient participated  Level of consciousness: awake and alert  Pain management: adequate  Airway patency: patent  Anesthetic complications: No anesthetic complications  PONV Status: none  Cardiovascular status: acceptable and hemodynamically stable  Respiratory status: acceptable  Hydration status: acceptable

## 2021-02-25 NOTE — ANESTHESIA PREPROCEDURE EVALUATION
Anesthesia Evaluation     Patient summary reviewed and Nursing notes reviewed   no history of anesthetic complications:               Airway   Mallampati: II  TM distance: >3 FB  Neck ROM: full  no difficulty expected  Dental - normal exam     Pulmonary - negative pulmonary ROS    breath sounds clear to auscultation  (-) shortness of breath, sleep apnea, decreased breath sounds, wheezes  Cardiovascular - normal exam  Exercise tolerance: good (4-7 METS)    Rhythm: regular  Rate: normal    (+) valvular problems/murmurs murmur,   (-) past MI, angina, CHF, orthopnea, PND, GEIGER, PVD      Neuro/Psych  (+) headaches, psychiatric history Anxiety,     (-) seizures, neuromuscular disease, TIA, CVA, dizziness/light headedness, weakness, numbness  GI/Hepatic/Renal/Endo - negative ROS   (-) liver disease, diabetes    Musculoskeletal     (+) arthralgias, joint swelling,   Abdominal  - normal exam   Substance History - negative use  (-) alcohol use, drug use     OB/GYN negative ob/gyn ROS         Other   arthritis,                      Anesthesia Plan    ASA 2     MAC   (D/W pt. MAC and possible awareness intra op.  Pt understands MAC and GA are not the same and the possibility of GA being required for failed MAC)  intravenous induction     Anesthetic plan, all risks, benefits, and alternatives have been provided, discussed and informed consent has been obtained with: patient.

## 2021-03-03 ENCOUNTER — APPOINTMENT (OUTPATIENT)
Dept: VACCINE CLINIC | Facility: HOSPITAL | Age: 46
End: 2021-03-03

## 2021-03-04 ENCOUNTER — IMMUNIZATION (OUTPATIENT)
Dept: VACCINE CLINIC | Facility: HOSPITAL | Age: 46
End: 2021-03-04

## 2021-03-04 PROCEDURE — 0002A: CPT | Performed by: INTERNAL MEDICINE

## 2021-03-04 PROCEDURE — 91300 HC SARSCOV02 VAC 30MCG/0.3ML IM: CPT | Performed by: INTERNAL MEDICINE

## 2021-03-09 ENCOUNTER — DOCUMENTATION (OUTPATIENT)
Dept: SURGERY | Facility: CLINIC | Age: 46
End: 2021-03-09

## 2021-06-14 DIAGNOSIS — B00.9 HSV (HERPES SIMPLEX VIRUS) INFECTION: ICD-10-CM

## 2021-06-14 RX ORDER — VALACYCLOVIR HYDROCHLORIDE 500 MG/1
TABLET, FILM COATED ORAL
Qty: 18 TABLET | Refills: 0 | Status: CANCELLED | OUTPATIENT
Start: 2021-06-14

## 2021-06-15 DIAGNOSIS — B00.9 HSV (HERPES SIMPLEX VIRUS) INFECTION: ICD-10-CM

## 2021-06-15 RX ORDER — VALACYCLOVIR HYDROCHLORIDE 500 MG/1
TABLET, FILM COATED ORAL
Qty: 18 TABLET | Refills: 0 | Status: CANCELLED | OUTPATIENT
Start: 2021-06-14

## 2021-11-24 ENCOUNTER — APPOINTMENT (OUTPATIENT)
Dept: WOMENS IMAGING | Facility: HOSPITAL | Age: 46
End: 2021-11-24

## 2021-11-24 ENCOUNTER — PROCEDURE VISIT (OUTPATIENT)
Dept: OBSTETRICS AND GYNECOLOGY | Age: 46
End: 2021-11-24

## 2021-11-24 DIAGNOSIS — Z12.31 VISIT FOR SCREENING MAMMOGRAM: Primary | ICD-10-CM

## 2021-11-24 PROCEDURE — 77067 SCR MAMMO BI INCL CAD: CPT | Performed by: RADIOLOGY

## 2021-11-24 PROCEDURE — 77063 BREAST TOMOSYNTHESIS BI: CPT | Performed by: RADIOLOGY

## 2021-11-24 PROCEDURE — 77067 SCR MAMMO BI INCL CAD: CPT | Performed by: STUDENT IN AN ORGANIZED HEALTH CARE EDUCATION/TRAINING PROGRAM

## 2021-11-24 PROCEDURE — 77063 BREAST TOMOSYNTHESIS BI: CPT | Performed by: STUDENT IN AN ORGANIZED HEALTH CARE EDUCATION/TRAINING PROGRAM

## 2022-02-28 ENCOUNTER — OFFICE VISIT (OUTPATIENT)
Dept: OBSTETRICS AND GYNECOLOGY | Age: 47
End: 2022-02-28

## 2022-02-28 VITALS
WEIGHT: 159 LBS | BODY MASS INDEX: 23.55 KG/M2 | HEIGHT: 69 IN | DIASTOLIC BLOOD PRESSURE: 68 MMHG | SYSTOLIC BLOOD PRESSURE: 112 MMHG

## 2022-02-28 DIAGNOSIS — Z01.419 WELL WOMAN EXAM WITH ROUTINE GYNECOLOGICAL EXAM: Primary | ICD-10-CM

## 2022-02-28 PROCEDURE — 99396 PREV VISIT EST AGE 40-64: CPT | Performed by: PHYSICIAN ASSISTANT

## 2022-02-28 NOTE — PROGRESS NOTES
Subjective     Chief Complaint   Patient presents with   • Gynecologic Exam     annual exam last pap 2020 neg/neg, m/g 2021       History of Present Illness    Christina Garcia is a 46 y.o.  who presents for annual exam.    She no longer has iud in place  Has regular menses   did get vasectomy    Did have CSC in   Good for 5 years, possibly 10, she is not entirely sure    No new med issues    Sister is Meghan Solo  Lived in [place] years, moved back here 5 ya    Her menses are regular every 28-30 days, lasting 4-7 days, dysmenorrhea none   Obstetric History:  OB History        3    Para   3    Term   3            AB        Living   3       SAB        IAB        Ectopic        Molar        Multiple        Live Births   3               Menstrual History:     Patient's last menstrual period was 2022 (approximate).         Current contraception: vasectomy  History of abnormal Pap smear: no  Received Gardasil immunization: no  Perform regular self breast exam: yes - occl  Family history of uterine or ovarian cancer: no  Family History of colon cancer: yes - mom in  her 70's  Family history of breast cancer: yes - MGM >50's    Mammogram: up to date.  Colonoscopy: up to date.  DEXA: not indicated.    Exercise: moderately active  Calcium/Vitamin D: adequate intake    The following portions of the patient's history were reviewed and updated as appropriate: allergies, current medications, past family history, past medical history, past social history, past surgical history and problem list.    Review of Systems   All other systems reviewed and are negative.      Review of Systems   Constitutional: Negative for fatigue.   Respiratory: Negative for shortness of breath.    Gastrointestinal: Negative for abdominal pain.   Genitourinary: Negative for dysuria.   Neurological: Negative for headaches.   Psychiatric/Behavioral: Negative for dysphoric mood.         Objective  "  Physical Exam    /68   Ht 175.3 cm (69\")   Wt 72.1 kg (159 lb)   LMP 02/14/2022 (Approximate)   Breastfeeding No   BMI 23.48 kg/m²   General:   alert, comfortable and no distress   Heart: regular rate and rhythm   Lungs: clear to auscultation bilaterally   Breast: normal appearance, no masses or tenderness, Inspection negative, No nipple retraction or dimpling, No nipple discharge or bleeding, No axillary or supraclavicular adenopathy, Normal to palpation without dominant masses, Taught monthly breast self examination   Neck: no adenopathy and no carotid bruit   Abdomen: {normal findings: soft, non-tender   CVA: Not performed today   Pelvis: External genitalia: normal general appearance  Vaginal: normal mucosa without prolapse or lesions  Cervix: normal appearance  Adnexa: normal bimanual exam  Uterus: normal single, nontender   Extremities: Not performed today   Neurologic: negative   Psychiatric: Normal affect, judgement, and mood     Assessment/Plan   Diagnoses and all orders for this visit:    1. Well woman exam with routine gynecological exam (Primary)        All questions answered.  Breast self exam technique reviewed and patient encouraged to perform self-exam monthly.  Discussed healthy lifestyle modifications.  Recommended 30 minutes of aerobic exercise five times per week.  Discussed calcium needs to prevent osteoporosis.    Pap utd  Mammogram utd  Csc utd  Call for any issues                "

## 2022-12-20 ENCOUNTER — PROCEDURE VISIT (OUTPATIENT)
Dept: OBSTETRICS AND GYNECOLOGY | Age: 47
End: 2022-12-20

## 2022-12-20 ENCOUNTER — APPOINTMENT (OUTPATIENT)
Dept: WOMENS IMAGING | Facility: HOSPITAL | Age: 47
End: 2022-12-20
Payer: COMMERCIAL

## 2022-12-20 DIAGNOSIS — Z12.31 VISIT FOR SCREENING MAMMOGRAM: Primary | ICD-10-CM

## 2022-12-20 PROCEDURE — 77067 SCR MAMMO BI INCL CAD: CPT | Performed by: RADIOLOGY

## 2022-12-20 PROCEDURE — 77063 BREAST TOMOSYNTHESIS BI: CPT | Performed by: RADIOLOGY

## 2022-12-20 PROCEDURE — 77063 BREAST TOMOSYNTHESIS BI: CPT | Performed by: PHYSICIAN ASSISTANT

## 2022-12-20 PROCEDURE — 77067 SCR MAMMO BI INCL CAD: CPT | Performed by: PHYSICIAN ASSISTANT

## 2023-01-13 ENCOUNTER — TRANSCRIBE ORDERS (OUTPATIENT)
Dept: PHYSICAL THERAPY | Facility: HOSPITAL | Age: 48
End: 2023-01-13
Payer: COMMERCIAL

## 2023-01-13 DIAGNOSIS — M54.50 LOW BACK PAIN, UNSPECIFIED BACK PAIN LATERALITY, UNSPECIFIED CHRONICITY, UNSPECIFIED WHETHER SCIATICA PRESENT: Primary | ICD-10-CM

## 2023-03-02 ENCOUNTER — OFFICE VISIT (OUTPATIENT)
Dept: OBSTETRICS AND GYNECOLOGY | Age: 48
End: 2023-03-02
Payer: COMMERCIAL

## 2023-03-02 VITALS
HEIGHT: 69 IN | SYSTOLIC BLOOD PRESSURE: 120 MMHG | DIASTOLIC BLOOD PRESSURE: 74 MMHG | BODY MASS INDEX: 23.99 KG/M2 | WEIGHT: 162 LBS

## 2023-03-02 DIAGNOSIS — N94.6 DYSMENORRHEA: ICD-10-CM

## 2023-03-02 DIAGNOSIS — N92.0 MENORRHAGIA WITH REGULAR CYCLE: ICD-10-CM

## 2023-03-02 DIAGNOSIS — Z11.51 SCREENING FOR HPV (HUMAN PAPILLOMAVIRUS): ICD-10-CM

## 2023-03-02 DIAGNOSIS — Z12.4 ENCOUNTER FOR PAPANICOLAOU SMEAR FOR CERVICAL CANCER SCREENING: ICD-10-CM

## 2023-03-02 DIAGNOSIS — Z01.419 WELL WOMAN EXAM WITH ROUTINE GYNECOLOGICAL EXAM: Primary | ICD-10-CM

## 2023-03-02 PROCEDURE — 99396 PREV VISIT EST AGE 40-64: CPT | Performed by: PHYSICIAN ASSISTANT

## 2023-03-02 NOTE — PROGRESS NOTES
Subjective     Chief Complaint   Patient presents with   • Gynecologic Exam     annual exam last pap 2020 neg/neg, m/g 2022, c/scope        History of Present Illness    Christina Garcia is a 47 y.o.  who presents for annual exam.    She is doing well but has noted pain in her low back with radiation down her leg  Has seen Dr Hernandez and he gave her a steroid shot  Also doing PT  Does wonder if it is related to her cycle or the positioning of her uterus  Only recently started with the pain    Notes heavier menses, especially first few days  Does not require BC so no longer using the iud  Didn't like it's impact on her mood anyway  Has considered an ablation but worries about messing with it    Health is good  Did have elevated cholesterol at her last visit but has opted against a statin  Is making some diet changes  Reduced meat and increasing healthy fats   Does not drink alcohol    Stays active but is changing it up b/c of her back pain      Her menses are regular every 28-30 days, lasting 4-7 days, dysmenorrhea moderate, occurring first 1-2 days of flow   Obstetric History:  OB History        3    Para   3    Term   3            AB        Living   3       SAB        IAB        Ectopic        Molar        Multiple        Live Births   3               Menstrual History:     Patient's last menstrual period was 2023 (approximate).         Current contraception: vasectomy  History of abnormal Pap smear: yes -   Received Gardasil immunization: no  Perform regular self breast exam: yes - occl  Family history of uterine or ovarian cancer: no  Family History of colon cancer: no  Family history of breast cancer: no    Mammogram: up to date.  Colonoscopy: up to date.  DEXA: not indicated.    Exercise: very active or moderately active  Calcium/Vitamin D: adequate intake    The following portions of the patient's history were reviewed and updated as appropriate: allergies, current  "medications, past family history, past medical history, past social history, past surgical history and problem list.    Review of Systems   Genitourinary: Positive for menstrual problem (increased bleeding and pain with cycle).   Musculoskeletal: Positive for back pain.   All other systems reviewed and are negative.      Review of Systems   Constitutional: Negative for fatigue.   Respiratory: Negative for shortness of breath.    Gastrointestinal: Negative for abdominal pain.   Genitourinary: Negative for dysuria.   Neurological: Negative for headaches.   Psychiatric/Behavioral: Negative for dysphoric mood.         Objective   Physical Exam    /74   Ht 175.3 cm (69\")   Wt 73.5 kg (162 lb)   LMP 02/16/2023 (Approximate)   BMI 23.92 kg/m²   General:   alert, comfortable and no distress   Heart: regular rate and rhythm   Lungs: clear to auscultation bilaterally   Breast: normal appearance, no masses or tenderness, Inspection negative, No nipple retraction or dimpling, No nipple discharge or bleeding, No axillary or supraclavicular adenopathy, Normal to palpation without dominant masses   Neck: no adenopathy and no carotid bruit   Abdomen: normal findings: soft, non-tender   CVA: Not performed today   Pelvis: External genitalia: normal general appearance  Vaginal: normal mucosa without prolapse or lesions  Cervix: normal appearance and thin prep PAP obtained  Adnexa: normal bimanual exam  Uterus: normal single, nontender   Extremities: Not performed today   Neurologic: negative   Psychiatric: Normal affect, judgement, and mood     Assessment & Plan   Diagnoses and all orders for this visit:    1. Well woman exam with routine gynecological exam (Primary)    2. Menorrhagia with regular cycle    3. Dysmenorrhea    4. Encounter for Papanicolaou smear for cervical cancer screening  -     IGP, [facility] HPV, Rfx 16 / 18,45    5. Screening for HPV (human papillomavirus)  -     IGP, [facility] HPV, Rfx 16 / 18,45        All " questions answered.  Breast self exam technique reviewed and patient encouraged to perform self-exam monthly.  Discussed healthy lifestyle modifications.  Recommended 30 minutes of aerobic exercise five times per week.  Discussed calcium needs to prevent osteoporosis.    Pap done  Mammogram utd  Disc heavy bleeding and pelvic pain, would enc her to schedule pelvic u/s with Dr Mayorga and can then discuss tx options  Declines IUD  Could try ibuprofen 600 mg tid to help with bleeding and pain

## 2023-03-08 NOTE — TELEPHONE ENCOUNTER
Rx Refill Note  Requested Prescriptions     Pending Prescriptions Disp Refills   • sertraline (ZOLOFT) 50 MG tablet [Pharmacy Med Name: Sertraline HCl 50 MG Oral Tablet (ZOLOFT)] 270 tablet 1     Sig: Take 3 tablets by mouth Daily.      Last office visit with prescribing clinician: 7/15/2022  Last labs: 7/15/2022  (Info from [facility])  Next office visit with prescribing clinician: Visit date not found       Noreen Garcia MA  03/08/23, 14:04 EST

## 2023-03-09 LAB
CYTOLOGIST CVX/VAG CYTO: NORMAL
CYTOLOGY CVX/VAG DOC CYTO: NORMAL
CYTOLOGY CVX/VAG DOC THIN PREP: NORMAL
DX ICD CODE: NORMAL
HIV 1 & 2 AB SER-IMP: NORMAL
HPV GENOTYPE REFLEX: NORMAL
HPV I/H RISK 4 DNA CVX QL PROBE+SIG AMP: NEGATIVE
OTHER STN SPEC: NORMAL
STAT OF ADQ CVX/VAG CYTO-IMP: NORMAL

## 2023-07-10 ENCOUNTER — TELEPHONE (OUTPATIENT)
Dept: FAMILY MEDICINE CLINIC | Facility: CLINIC | Age: 48
End: 2023-07-10

## 2023-07-10 NOTE — TELEPHONE ENCOUNTER
Caller: Christina Garcia    Relationship to patient: Self    Best call back number: [phone]    Chief complaint: PAIN IN BACK, LEG PAIN, MED CHECK AND REFILLS     Type of visit: OFFICE VISIT     Requested date: AS SOON AS POSSIBLE      If rescheduling, when is the original appointment:      Additional notes:

## 2023-08-01 ENCOUNTER — OFFICE VISIT (OUTPATIENT)
Dept: FAMILY MEDICINE CLINIC | Facility: CLINIC | Age: 48
End: 2023-08-01
Payer: COMMERCIAL

## 2023-08-01 VITALS
HEART RATE: 73 BPM | WEIGHT: 157.1 LBS | HEIGHT: 69 IN | BODY MASS INDEX: 23.27 KG/M2 | OXYGEN SATURATION: 99 % | SYSTOLIC BLOOD PRESSURE: 118 MMHG | DIASTOLIC BLOOD PRESSURE: 83 MMHG

## 2023-08-01 DIAGNOSIS — A60.00 HERPES SIMPLEX INFECTION OF GENITOURINARY SYSTEM: ICD-10-CM

## 2023-08-01 DIAGNOSIS — M54.16 LUMBAR RADICULOPATHY: Primary | ICD-10-CM

## 2023-08-01 DIAGNOSIS — E78.00 PURE HYPERCHOLESTEROLEMIA: ICD-10-CM

## 2023-08-01 DIAGNOSIS — F41.1 GENERALIZED ANXIETY DISORDER: ICD-10-CM

## 2023-08-01 PROBLEM — F41.0 PANIC ATTACK: Status: ACTIVE | Noted: 2023-08-01

## 2023-08-01 PROBLEM — M54.50 CHRONIC LOW BACK PAIN: Status: ACTIVE | Noted: 2023-01-16

## 2023-08-01 PROBLEM — Z12.11 SCREENING FOR COLON CANCER: Status: RESOLVED | Noted: 2021-01-21 | Resolved: 2023-08-01

## 2023-08-01 PROBLEM — R01.1 HEART MURMUR: Status: ACTIVE | Noted: 2023-08-01

## 2023-08-01 PROBLEM — G89.29 CHRONIC LOW BACK PAIN: Status: ACTIVE | Noted: 2023-01-16

## 2023-08-01 PROBLEM — F41.9 ANXIETY: Status: RESOLVED | Noted: 2021-02-18 | Resolved: 2023-08-01

## 2023-08-01 RX ORDER — VALACYCLOVIR HYDROCHLORIDE 500 MG/1
500 TABLET, FILM COATED ORAL 2 TIMES DAILY
Qty: 18 TABLET | Refills: 2 | Status: SHIPPED | OUTPATIENT
Start: 2023-08-01

## 2023-08-02 ENCOUNTER — LAB (OUTPATIENT)
Dept: LAB | Facility: HOSPITAL | Age: 48
End: 2023-08-02
Payer: COMMERCIAL

## 2023-08-02 DIAGNOSIS — F41.1 GENERALIZED ANXIETY DISORDER: ICD-10-CM

## 2023-08-02 DIAGNOSIS — E78.00 PURE HYPERCHOLESTEROLEMIA: Primary | ICD-10-CM

## 2023-08-02 LAB
ALBUMIN SERPL-MCNC: 4.5 G/DL (ref 3.5–5.2)
ALBUMIN/GLOB SERPL: 1.9 G/DL
ALP SERPL-CCNC: 45 U/L (ref 39–117)
ALT SERPL W P-5'-P-CCNC: 12 U/L (ref 1–33)
ANION GAP SERPL CALCULATED.3IONS-SCNC: 8.6 MMOL/L (ref 5–15)
AST SERPL-CCNC: 16 U/L (ref 1–32)
BILIRUB SERPL-MCNC: 0.3 MG/DL (ref 0–1.2)
BUN SERPL-MCNC: 11 MG/DL (ref 6–20)
BUN/CREAT SERPL: 14.5 (ref 7–25)
CALCIUM SPEC-SCNC: 9.9 MG/DL (ref 8.6–10.5)
CHLORIDE SERPL-SCNC: 107 MMOL/L (ref 98–107)
CHOLEST SERPL-MCNC: 210 MG/DL (ref 0–200)
CO2 SERPL-SCNC: 27.4 MMOL/L (ref 22–29)
CREAT SERPL-MCNC: 0.76 MG/DL (ref 0.57–1)
EGFRCR SERPLBLD CKD-EPI 2021: 96.8 ML/MIN/1.73
GLOBULIN UR ELPH-MCNC: 2.4 GM/DL
GLUCOSE SERPL-MCNC: 96 MG/DL (ref 65–99)
HCYS SERPL-MCNC: 9.4 UMOL/L (ref 0–15)
HDLC SERPL-MCNC: 65 MG/DL (ref 40–60)
HOLD SPECIMEN: NORMAL
LDLC SERPL CALC-MCNC: 134 MG/DL (ref 0–100)
LDLC/HDLC SERPL: 2.05 {RATIO}
POTASSIUM SERPL-SCNC: 4.6 MMOL/L (ref 3.5–5.2)
PROT SERPL-MCNC: 6.9 G/DL (ref 6–8.5)
SODIUM SERPL-SCNC: 143 MMOL/L (ref 136–145)
TRIGL SERPL-MCNC: 60 MG/DL (ref 0–150)
TSH SERPL DL<=0.05 MIU/L-ACNC: 2.26 UIU/ML (ref 0.27–4.2)
VLDLC SERPL-MCNC: 11 MG/DL (ref 5–40)
WHOLE BLOOD HOLD SPECIMEN: NORMAL

## 2023-08-02 PROCEDURE — 84443 ASSAY THYROID STIM HORMONE: CPT

## 2023-08-02 PROCEDURE — 83735 ASSAY OF MAGNESIUM: CPT

## 2023-08-02 PROCEDURE — 36415 COLL VENOUS BLD VENIPUNCTURE: CPT

## 2023-08-02 PROCEDURE — 80053 COMPREHEN METABOLIC PANEL: CPT

## 2023-08-02 PROCEDURE — 80061 LIPID PANEL: CPT

## 2023-08-02 PROCEDURE — 83090 ASSAY OF HOMOCYSTEINE: CPT

## 2023-08-08 LAB — MAGNESIUM RBC-MCNC: 5.2 MG/DL (ref 4.2–6.8)

## 2023-08-14 ENCOUNTER — APPOINTMENT (OUTPATIENT)
Dept: OTHER | Facility: HOSPITAL | Age: 48
End: 2023-08-14
Payer: COMMERCIAL

## 2023-08-14 ENCOUNTER — HOSPITAL ENCOUNTER (OUTPATIENT)
Dept: CT IMAGING | Facility: HOSPITAL | Age: 48
Discharge: HOME | End: 2023-08-14
Payer: COMMERCIAL

## 2023-08-14 DIAGNOSIS — M54.50 LUMBAR PAIN: ICD-10-CM

## 2023-08-14 PROCEDURE — 72131 CT LUMBAR SPINE W/O DYE: CPT

## 2023-08-22 DIAGNOSIS — Z12.31 ENCOUNTER FOR SCREENING MAMMOGRAM FOR MALIGNANT NEOPLASM OF BREAST: Primary | ICD-10-CM

## 2023-12-21 ENCOUNTER — HOSPITAL ENCOUNTER (OUTPATIENT)
Facility: HOSPITAL | Age: 48
Discharge: HOME | End: 2023-12-21
Admitting: STUDENT IN AN ORGANIZED HEALTH CARE EDUCATION/TRAINING PROGRAM
Payer: COMMERCIAL

## 2023-12-21 DIAGNOSIS — Z12.31 ENCOUNTER FOR SCREENING MAMMOGRAM FOR MALIGNANT NEOPLASM OF BREAST: ICD-10-CM

## 2023-12-21 PROCEDURE — 77067 SCR MAMMO BI INCL CAD: CPT

## 2023-12-21 PROCEDURE — 77063 BREAST TOMOSYNTHESIS BI: CPT

## 2023-12-29 ENCOUNTER — TELEPHONE (OUTPATIENT)
Dept: OBSTETRICS AND GYNECOLOGY | Age: 48
End: 2023-12-29
Payer: COMMERCIAL

## 2023-12-29 NOTE — TELEPHONE ENCOUNTER
----- Message from Ilda Mayorga MD sent at 12/29/2023  2:53 PM EST -----  Please call patient to let her know her mammogram is normal and should be repeated in 1 year.  Thank you,  Ilda Mayorga MD  12/29/23 14:53 EST

## 2024-01-08 ENCOUNTER — OFFICE VISIT (OUTPATIENT)
Dept: NEUROSURGERY | Facility: CLINIC | Age: 49
End: 2024-01-08
Payer: COMMERCIAL

## 2024-01-08 VITALS
WEIGHT: 157 LBS | DIASTOLIC BLOOD PRESSURE: 80 MMHG | HEIGHT: 69 IN | SYSTOLIC BLOOD PRESSURE: 142 MMHG | BODY MASS INDEX: 23.25 KG/M2 | RESPIRATION RATE: 20 BRPM

## 2024-01-08 DIAGNOSIS — R29.2 HYPERREFLEXIA: ICD-10-CM

## 2024-01-08 DIAGNOSIS — R25.8 CLONUS: ICD-10-CM

## 2024-01-08 DIAGNOSIS — M43.16 SPONDYLOLISTHESIS AT L4-L5 LEVEL: ICD-10-CM

## 2024-01-08 DIAGNOSIS — M48.062 SPINAL STENOSIS OF LUMBAR REGION WITH NEUROGENIC CLAUDICATION: ICD-10-CM

## 2024-01-08 DIAGNOSIS — M51.16 HERNIATION OF LUMBAR INTERVERTEBRAL DISC WITH RADICULOPATHY: Primary | ICD-10-CM

## 2024-01-08 DIAGNOSIS — F40.240 CLAUSTROPHOBIA: ICD-10-CM

## 2024-01-08 RX ORDER — DIAZEPAM 5 MG/1
TABLET ORAL
Qty: 2 TABLET | Refills: 0 | Status: SHIPPED | OUTPATIENT
Start: 2024-01-08

## 2024-01-08 NOTE — LETTER
January 8, 2024       No Recipients    Patient: Christina Garcia   YOB: 1975   Date of Visit: 1/8/2024     Dear Ana Cristina Ridley MD:       Thank you for referring Christina Garcia to me for evaluation. Below are the relevant portions of my assessment and plan of care.    If you have questions, please do not hesitate to call me. I look forward to following Christina along with you.         Sincerely,        Miki Peterson MD        CC:   No Recipients    Miki Peterson MD  01/08/24 1822  Sign when Signing Visit  Subjective  Patient ID: Christina Garcia is a 48 y.o. female is being seen for consultation today at the request of Michael Parson MD SECOND OPINION - SPINAL STENOSIS; SPONDYLOLISTHESIS - CT LUMBAR SPINE WITHOUT CONTRAST SCHEDULED ON 08/14/23 @ [facility]       The patient is here with her mother.  She is an .  She is here for a second opinion.  She has been seen by Dr. Parson beginning about a year ago when she began having some low back pain.  She got a steroid injection in the office and she said that shortly after she began having some radiating left leg pain that was quite severe that radiated all the way down into the calf.  She underwent some physical therapy which aggravated the symptoms.  She went on to have 3 epidural blocks which helped somewhat but she was still having pain.  She was found to have a left L3-L4 far lateral herniated disc as well as some spinal stenosis and spondylolisthesis at L4-L5.  She has never had any right leg symptoms and the low back pain had been modest.  She went to see Dr. Steinberg and a decompression and fusion at L4-L5 was discussed.  She wanted to avoid surgery and began her own online exercise program which stressed a lot of flexibility and core work.  She feels that she has gradually made progress over the last 3 months since starting it and feels much more functional now.  She only has intermittent pain in her left leg.  She  "is working.  She can walk about 2 to 3 miles.  She is very functional.  When I examined her she does have some hyperreflexia and some clonus and Mandy signs documented below.  She has no symptoms of pain in the neck or the arms and she has no ataxia.  I think it is worthwhile to get a cervical MRI to check if she has some cervical stenosis.  Even if she did it probably be best to just watch it since she does not really have any active symptoms.  On the other hand given that she is female, the hyperreflexia could be within normal limits.  But as far as the lumbar spine goes I think it is best to hold off on any kind of surgery and just follow her.  She is aware of the risk of recurrent sciatica and that we might need to talk about surgery in the future.  She has asked me to assume her care and I have agreed to do so.      History of Present Illness    The following portions of the patient's history were reviewed and updated as appropriate: allergies, current medications, past family history, past medical history, past social history, past surgical history, and problem list.    Review of Systems   Constitutional:  Negative for fever.   Musculoskeletal:  Positive for back pain.   Neurological:  Negative for weakness and numbness.   All other systems reviewed and are negative.          Objective    Vitals:    01/08/24 1547   BP: 142/80   BP Location: Left arm   Patient Position: Sitting   Cuff Size: Adult   Resp: 20   Weight: 71.2 kg (157 lb)   Height: 175.3 cm (69.02\")   PainSc: 0-No pain     Body mass index is 23.17 kg/m².    Tobacco Use: Medium Risk (1/8/2024)    Patient History    • Smoking Tobacco Use: Former    • Smokeless Tobacco Use: Never    • Passive Exposure: Never          Physical Exam  Constitutional:       Appearance: She is well-developed.   HENT:      Head: Normocephalic and atraumatic.   Eyes:      Extraocular Movements: EOM normal.      Conjunctiva/sclera: Conjunctivae normal.      Pupils: Pupils " are equal, round, and reactive to light.   Neck:      Vascular: No carotid bruit.   Neurological:      Mental Status: She is oriented to person, place, and time.      Coordination: Finger-Nose-Finger Test and Heel to Shin Test normal.      Gait: Gait is intact.      Deep Tendon Reflexes:      Reflex Scores:       Tricep reflexes are 3+ on the right side and 3+ on the left side.       Bicep reflexes are 3+ on the right side and 3+ on the left side.       Brachioradialis reflexes are 4+ on the right side and 4+ on the left side.       Patellar reflexes are 4+ on the right side and 4+ on the left side.       Achilles reflexes are 2+ on the right side and 2+ on the left side.  Psychiatric:         Speech: Speech normal.       Neurologic Exam     Mental Status   Oriented to person, place, and time.   Registration of memory: Good recent and remote memory.   Attention: normal. Concentration: normal.   Speech: speech is normal   Level of consciousness: alert  Knowledge: consistent with education.     Cranial Nerves     CN II   Visual fields full to confrontation.   Visual acuity: normal    CN III, IV, VI   Pupils are equal, round, and reactive to light.  Extraocular motions are normal.     CN V   Facial sensation intact.   Right corneal reflex: normal  Left corneal reflex: normal    CN VII   Facial expression full, symmetric.   Right facial weakness: none  Left facial weakness: none    CN VIII   Hearing: intact    CN IX, X   Palate: symmetric    CN XI   Right sternocleidomastoid strength: normal  Left sternocleidomastoid strength: normal    CN XII   Tongue: not atrophic  Tongue deviation: none    Motor Exam   Muscle bulk: normal  Right arm tone: normal  Left arm tone: normal  Right leg tone: normal  Left leg tone: normal    Strength   Strength 5/5 except as noted.     Sensory Exam   Light touch normal.     Gait, Coordination, and Reflexes     Gait  Gait: normal    Coordination   Finger to nose coordination: normal  Heel to  shin coordination: normal    Reflexes   Right brachioradialis: 4+  Left brachioradialis: 4+  Right biceps: 3+  Left biceps: 3+  Right triceps: 3+  Left triceps: 3+  Right patellar: 4+  Left patellar: 4+  Right achilles: 2+  Left achilles: 2+  Right : 2+  Left : 2+  Right Pinzon: present  Left Pinzon: present  Right ankle clonus: present  Left ankle clonus: present          Assessment & Plan  Independent Review of Radiographic Studies:      I personally reviewed the images from the following studies.    The MRI done on 3/15/2023 was reviewed.  There is a far lateral left L3-L4 disc herniation as well as spinal stenosis at L4-L5 with a superimposed spondylolisthesis.  Agree with the report.    Medical Decision Making:      As long as she is feeling well and is functional I do not think there is any reason to consider a lumbar decompression and fusion on her.  But given her hyperreflexia and the clonus I think we should get a cervical MRI to make sure she does or does not have cervical stenosis or something that could cause these reflex findings.  Will have her come back in a month after the cervical MRI and she will continue her home exercises.  If she develops some recurrent sciatica, she will let me know.  Will need some Valium for the MRI as she is claustrophobic.        Diagnoses and all orders for this visit:    1. Herniation of lumbar intervertebral disc with radiculopathy (Primary)    2. Spinal stenosis of lumbar region with neurogenic claudication    3. Spondylolisthesis at L4-L5 level    4. Hyperreflexia  -     MRI Cervical Spine Without Contrast; Future    5. Clonus  -     MRI Cervical Spine Without Contrast; Future    6. Claustrophobia  -     diazePAM (Valium) 5 MG tablet; Take 1 to 2 tablets by mouth 30 minutes before MRI.  Dispense: 2 tablet; Refill: 0      Return in about 4 weeks (around 2/5/2024) for face to face after MRI.

## 2024-01-08 NOTE — PROGRESS NOTES
Subjective   Patient ID: Christina Garcia is a 48 y.o. female is being seen for consultation today at the request of Michael Parson MD SECOND OPINION - SPINAL STENOSIS; SPONDYLOLISTHESIS - CT LUMBAR SPINE WITHOUT CONTRAST SCHEDULED ON 08/14/23 @ [facility]       The patient is here with her mother.  She is an .  She is here for a second opinion.  She has been seen by Dr. Parson beginning about a year ago when she began having some low back pain.  She got a steroid injection in the office and she said that shortly after she began having some radiating left leg pain that was quite severe that radiated all the way down into the calf.  She underwent some physical therapy which aggravated the symptoms.  She went on to have 3 epidural blocks which helped somewhat but she was still having pain.  She was found to have a left L3-L4 far lateral herniated disc as well as some spinal stenosis and spondylolisthesis at L4-L5.  She has never had any right leg symptoms and the low back pain had been modest.  She went to see Dr. Steinberg and a decompression and fusion at L4-L5 was discussed.  She wanted to avoid surgery and began her own online exercise program which stressed a lot of flexibility and core work.  She feels that she has gradually made progress over the last 3 months since starting it and feels much more functional now.  She only has intermittent pain in her left leg.  She is working.  She can walk about 2 to 3 miles.  She is very functional.  When I examined her she does have some hyperreflexia and some clonus and Mandy signs documented below.  She has no symptoms of pain in the neck or the arms and she has no ataxia.  I think it is worthwhile to get a cervical MRI to check if she has some cervical stenosis.  Even if she did it probably be best to just watch it since she does not really have any active symptoms.  On the other hand given that she is female, the hyperreflexia could be within normal  "limits.  But as far as the lumbar spine goes I think it is best to hold off on any kind of surgery and just follow her.  She is aware of the risk of recurrent sciatica and that we might need to talk about surgery in the future.  She has asked me to assume her care and I have agreed to do so.      History of Present Illness    The following portions of the patient's history were reviewed and updated as appropriate: allergies, current medications, past family history, past medical history, past social history, past surgical history, and problem list.    Review of Systems   Constitutional:  Negative for fever.   Musculoskeletal:  Positive for back pain.   Neurological:  Negative for weakness and numbness.   All other systems reviewed and are negative.          Objective     Vitals:    01/08/24 1547   BP: 142/80   BP Location: Left arm   Patient Position: Sitting   Cuff Size: Adult   Resp: 20   Weight: 71.2 kg (157 lb)   Height: 175.3 cm (69.02\")   PainSc: 0-No pain     Body mass index is 23.17 kg/m².    Tobacco Use: Medium Risk (1/8/2024)    Patient History     Smoking Tobacco Use: Former     Smokeless Tobacco Use: Never     Passive Exposure: Never          Physical Exam  Constitutional:       Appearance: She is well-developed.   HENT:      Head: Normocephalic and atraumatic.   Eyes:      Extraocular Movements: EOM normal.      Conjunctiva/sclera: Conjunctivae normal.      Pupils: Pupils are equal, round, and reactive to light.   Neck:      Vascular: No carotid bruit.   Neurological:      Mental Status: She is oriented to person, place, and time.      Coordination: Finger-Nose-Finger Test and Heel to Shin Test normal.      Gait: Gait is intact.      Deep Tendon Reflexes:      Reflex Scores:       Tricep reflexes are 3+ on the right side and 3+ on the left side.       Bicep reflexes are 3+ on the right side and 3+ on the left side.       Brachioradialis reflexes are 4+ on the right side and 4+ on the left side.       " Patellar reflexes are 4+ on the right side and 4+ on the left side.       Achilles reflexes are 2+ on the right side and 2+ on the left side.  Psychiatric:         Speech: Speech normal.       Neurologic Exam     Mental Status   Oriented to person, place, and time.   Registration of memory: Good recent and remote memory.   Attention: normal. Concentration: normal.   Speech: speech is normal   Level of consciousness: alert  Knowledge: consistent with education.     Cranial Nerves     CN II   Visual fields full to confrontation.   Visual acuity: normal    CN III, IV, VI   Pupils are equal, round, and reactive to light.  Extraocular motions are normal.     CN V   Facial sensation intact.   Right corneal reflex: normal  Left corneal reflex: normal    CN VII   Facial expression full, symmetric.   Right facial weakness: none  Left facial weakness: none    CN VIII   Hearing: intact    CN IX, X   Palate: symmetric    CN XI   Right sternocleidomastoid strength: normal  Left sternocleidomastoid strength: normal    CN XII   Tongue: not atrophic  Tongue deviation: none    Motor Exam   Muscle bulk: normal  Right arm tone: normal  Left arm tone: normal  Right leg tone: normal  Left leg tone: normal    Strength   Strength 5/5 except as noted.     Sensory Exam   Light touch normal.     Gait, Coordination, and Reflexes     Gait  Gait: normal    Coordination   Finger to nose coordination: normal  Heel to shin coordination: normal    Reflexes   Right brachioradialis: 4+  Left brachioradialis: 4+  Right biceps: 3+  Left biceps: 3+  Right triceps: 3+  Left triceps: 3+  Right patellar: 4+  Left patellar: 4+  Right achilles: 2+  Left achilles: 2+  Right : 2+  Left : 2+  Right Pinzon: present  Left Pinzon: present  Right ankle clonus: present  Left ankle clonus: present          Assessment & Plan   Independent Review of Radiographic Studies:      I personally reviewed the images from the following studies.    The MRI done on  3/15/2023 was reviewed.  There is a far lateral left L3-L4 disc herniation as well as spinal stenosis at L4-L5 with a superimposed spondylolisthesis.  Agree with the report.    Medical Decision Making:      As long as she is feeling well and is functional I do not think there is any reason to consider a lumbar decompression and fusion on her.  But given her hyperreflexia and the clonus I think we should get a cervical MRI to make sure she does or does not have cervical stenosis or something that could cause these reflex findings.  Will have her come back in a month after the cervical MRI and she will continue her home exercises.  If she develops some recurrent sciatica, she will let me know.  Will need some Valium for the MRI as she is claustrophobic.        Diagnoses and all orders for this visit:    1. Herniation of lumbar intervertebral disc with radiculopathy (Primary)    2. Spinal stenosis of lumbar region with neurogenic claudication    3. Spondylolisthesis at L4-L5 level    4. Hyperreflexia  -     MRI Cervical Spine Without Contrast; Future    5. Clonus  -     MRI Cervical Spine Without Contrast; Future    6. Claustrophobia  -     diazePAM (Valium) 5 MG tablet; Take 1 to 2 tablets by mouth 30 minutes before MRI.  Dispense: 2 tablet; Refill: 0      Return in about 4 weeks (around 2/5/2024) for face to face after MRI.

## 2024-01-10 ENCOUNTER — TELEPHONE (OUTPATIENT)
Dept: NEUROSURGERY | Facility: CLINIC | Age: 49
End: 2024-01-10

## 2024-01-10 NOTE — TELEPHONE ENCOUNTER
Caller: Christina Garcia    Relationship: Self    Best call back number: [phone]    What orders are you requesting (i.e. lab or imaging): MRI CERVICAL SPINE    In what timeframe would the patient need to come in: PRIOR TO 1/31/24    Where will you receive your lab/imaging services: [facility] ([facility]    Additional notes: PLEASE CALL PATIENT TO ADVISE ONCE MRI ORDER HAS BEEN SENT, AS SHE STATES THEY HAVE NOT YET RECEIVED.

## 2024-01-12 ENCOUNTER — PATIENT ROUNDING (BHMG ONLY) (OUTPATIENT)
Dept: NEUROSURGERY | Facility: CLINIC | Age: 49
End: 2024-01-12
Payer: COMMERCIAL

## 2024-02-01 ENCOUNTER — OFFICE VISIT (OUTPATIENT)
Dept: NEUROSURGERY | Facility: CLINIC | Age: 49
End: 2024-02-01
Payer: COMMERCIAL

## 2024-02-01 VITALS
HEIGHT: 69 IN | BODY MASS INDEX: 23.25 KG/M2 | RESPIRATION RATE: 18 BRPM | DIASTOLIC BLOOD PRESSURE: 74 MMHG | SYSTOLIC BLOOD PRESSURE: 126 MMHG | WEIGHT: 157 LBS

## 2024-02-01 DIAGNOSIS — R25.8 CLONUS: ICD-10-CM

## 2024-02-01 DIAGNOSIS — R29.2 HYPERREFLEXIA: ICD-10-CM

## 2024-02-01 DIAGNOSIS — M43.16 SPONDYLOLISTHESIS AT L4-L5 LEVEL: Primary | ICD-10-CM

## 2024-02-01 DIAGNOSIS — M48.062 SPINAL STENOSIS OF LUMBAR REGION WITH NEUROGENIC CLAUDICATION: ICD-10-CM

## 2024-02-01 DIAGNOSIS — M51.16 HERNIATION OF LUMBAR INTERVERTEBRAL DISC WITH RADICULOPATHY: ICD-10-CM

## 2024-02-01 DIAGNOSIS — M50.322 DEGENERATION OF INTERVERTEBRAL DISC AT C5-C6 LEVEL: ICD-10-CM

## 2024-02-01 PROCEDURE — 99213 OFFICE O/P EST LOW 20 MIN: CPT | Performed by: NEUROLOGICAL SURGERY

## 2024-02-01 RX ORDER — METHYLPREDNISOLONE 4 MG/1
TABLET ORAL
Qty: 21 TABLET | Refills: 0 | Status: SHIPPED | OUTPATIENT
Start: 2024-02-01

## 2024-02-01 NOTE — PROGRESS NOTES
"Subjective   Patient ID: Christina Garcia is a 48 y.o. female is here today for follow-up after MRI    Is very nice lady is with her .  I saw her a few weeks ago.  She has spinal stenosis at L4-L5 with a spondylolisthesis and a far lateral left L3-L4 disc herniation.  By the time she saw me her symptoms were better with home exercise program and so on following her.  I picked up on some hyperreflexia and some mild nonsustained clonus and I wanted to make sure she did not have some cervical cord compression.  The cervical MRI was reviewed.  There is no cord compression.  I think her hyperreflexia is within normal limits for woman of her age.  I explained this to her.  Will go ahead and set continue to follow her and I will see her in 8 months with regards to her low back.  If there is a flareup in between she will let me know.  I told her I will give her a steroid pack in case she had a flareup when she went on a trip to [place] in a few weeks.        History of Present Illness    The following portions of the patient's history were reviewed and updated as appropriate: allergies, current medications, past family history, past medical history, past social history, past surgical history, and problem list.    Review of Systems   Constitutional:  Negative for fever.   Musculoskeletal:  Positive for back pain.   Neurological:  Negative for weakness and numbness.   All other systems reviewed and are negative.          Objective     Vitals:    02/01/24 1239   BP: 126/74   BP Location: Left arm   Patient Position: Sitting   Cuff Size: Adult   Resp: 18   Weight: 71.2 kg (157 lb)   Height: 175.3 cm (69.02\")   PainSc: 0-No pain     Body mass index is 23.17 kg/m².    Tobacco Use: Medium Risk (2/1/2024)    Patient History     Smoking Tobacco Use: Former     Smokeless Tobacco Use: Never     Passive Exposure: Never          Physical Exam  Constitutional:       Appearance: She is well-developed.   HENT:      Head: " Normocephalic and atraumatic.   Eyes:      Extraocular Movements: EOM normal.      Conjunctiva/sclera: Conjunctivae normal.      Pupils: Pupils are equal, round, and reactive to light.   Neck:      Vascular: No carotid bruit.   Neurological:      Mental Status: She is oriented to person, place, and time.      Coordination: Finger-Nose-Finger Test and Heel to Shin Test normal.      Gait: Gait is intact.      Deep Tendon Reflexes:      Reflex Scores:       Tricep reflexes are 3+ on the right side and 3+ on the left side.       Bicep reflexes are 3+ on the right side and 3+ on the left side.       Brachioradialis reflexes are 3+ on the right side and 3+ on the left side.       Patellar reflexes are 4+ on the right side and 4+ on the left side.       Achilles reflexes are 3+ on the right side and 3+ on the left side.  Psychiatric:         Speech: Speech normal.       Neurologic Exam     Mental Status   Oriented to person, place, and time.   Registration of memory: Good recent and remote memory.   Attention: normal. Concentration: normal.   Speech: speech is normal   Level of consciousness: alert  Knowledge: consistent with education.     Cranial Nerves     CN II   Visual fields full to confrontation.   Visual acuity: normal    CN III, IV, VI   Pupils are equal, round, and reactive to light.  Extraocular motions are normal.     CN V   Facial sensation intact.   Right corneal reflex: normal  Left corneal reflex: normal    CN VII   Facial expression full, symmetric.   Right facial weakness: none  Left facial weakness: none    CN VIII   Hearing: intact    CN IX, X   Palate: symmetric    CN XI   Right sternocleidomastoid strength: normal  Left sternocleidomastoid strength: normal    CN XII   Tongue: not atrophic  Tongue deviation: none    Motor Exam   Muscle bulk: normal  Right arm tone: normal  Left arm tone: normal  Right leg tone: normal  Left leg tone: normal    Strength   Strength 5/5 except as noted.     Sensory Exam    Light touch normal.     Gait, Coordination, and Reflexes     Gait  Gait: normal    Coordination   Finger to nose coordination: normal  Heel to shin coordination: normal    Reflexes   Right brachioradialis: 3+  Left brachioradialis: 3+  Right biceps: 3+  Left biceps: 3+  Right triceps: 3+  Left triceps: 3+  Right patellar: 4+  Left patellar: 4+  Right achilles: 3+  Left achilles: 3+  Right : 2+  Left : 2+  Right ankle clonus: present  Left ankle clonus: present          Assessment & Plan   Independent Review of Radiographic Studies:      I personally reviewed the images from the following studies.    The cervical MRI just shows some mild disc degeneration at C6-C7 but no significant stenosis or cord compression.  Agree with the report.        Medical Decision Making:      Again no cervical cord compression.  I will follow her for her lumbar problem.  She will do her home exercises.  She was concerned about having a flareup when she goes to [place] on vacation in a few weeks and wanted a Medrol Dosepak for that potential flareup.  Will give that to her.  Otherwise I will see her in 8 months, sooner if there is severe worsening of lumbar symptoms.    Diagnoses and all orders for this visit:    1. Spondylolisthesis at L4-L5 level (Primary)    2. Spinal stenosis of lumbar region with neurogenic claudication    3. Herniation of lumbar intervertebral disc with radiculopathy    4. Hyperreflexia    5. Clonus    6. Degeneration of intervertebral disc at C5-C6 level    Other orders  -     methylPREDNISolone (MEDROL) 4 MG dose pack; Take as directed on package instructions.  Dispense: 21 tablet; Refill: 0      Return in about 8 months (around 10/1/2024) for face to face.

## 2024-03-07 DIAGNOSIS — F41.1 GENERALIZED ANXIETY DISORDER: ICD-10-CM

## 2024-03-07 NOTE — TELEPHONE ENCOUNTER
Rx Refill Note  Requested Prescriptions     Pending Prescriptions Disp Refills    sertraline (ZOLOFT) 50 MG tablet 270 tablet 1     Sig: Take 3 tablets by mouth Daily.      Last office visit with prescribing clinician: 8/1/2023   Last telemedicine visit with prescribing clinician: Visit date not found   Next office visit with prescribing clinician: Visit date not found       Yue Vale  03/07/24, 08:09 EST

## 2024-03-18 ENCOUNTER — OFFICE VISIT (OUTPATIENT)
Dept: OBSTETRICS AND GYNECOLOGY | Age: 49
End: 2024-03-18
Payer: COMMERCIAL

## 2024-03-18 VITALS
HEIGHT: 69 IN | DIASTOLIC BLOOD PRESSURE: 70 MMHG | BODY MASS INDEX: 24.44 KG/M2 | SYSTOLIC BLOOD PRESSURE: 124 MMHG | WEIGHT: 165 LBS

## 2024-03-18 DIAGNOSIS — Z01.419 WELL WOMAN EXAM WITH ROUTINE GYNECOLOGICAL EXAM: Primary | ICD-10-CM

## 2024-03-18 DIAGNOSIS — N95.1 PERIMENOPAUSAL SYMPTOM: ICD-10-CM

## 2024-03-18 PROCEDURE — 99396 PREV VISIT EST AGE 40-64: CPT | Performed by: PHYSICIAN ASSISTANT

## 2024-03-18 RX ORDER — PROGESTERONE 100 MG/1
100 CAPSULE ORAL DAILY
Qty: 30 CAPSULE | Refills: 3 | Status: SHIPPED | OUTPATIENT
Start: 2024-03-18

## 2024-03-18 NOTE — PROGRESS NOTES
Subjective     Chief Complaint   Patient presents with    Annual Exam     Annual exam last pap 2023 neg/neg, m/g 2023, colonoscopy        History of Present Illness    Christina Garcia is a 48 y.o.  who presents for annual exam.    Her back is doing well  Did an online program called [facility]hab fix  Did it for 24 wks and is now doing it on her own  Feels great now  Getting back into working out slowly    Is having perimenopausal sxs  Is noting night sweats and bloating  Has one normal week a month  Has really heavy first day of bleeding  Has done some reading and is interested  in trying progesterone supplementation    We have discussed bcp in the past for this and also discussed an iud for cycle control  She declines both    Is on zoloft for mood changes    BC is vasectomy    Mammogram utd, done in Dec  [facility] utd     is an oncologist pharmacist, Frank Jose  Her menses are regular every 25 days, lasting 4-7 days, dysmenorrhea none   Obstetric History:  OB History          3    Para   3    Term   3            AB        Living   3         SAB        IAB        Ectopic        Molar        Multiple        Live Births   3               Menstrual History:     Patient's last menstrual period was 2024 (approximate).         Current contraception: vasectomy  History of abnormal Pap smear: yes -   Received Gardasil immunization: no  Perform regular self breast exam: yes - mthly  Family history of uterine or ovarian cancer: no  Family History of colon cancer: yes - mom  Family history of breast cancer: yes - mgm     Mammogram: up to date.  Colonoscopy: up to date.  DEXA: not indicated.    Exercise: moderately active  Calcium/Vitamin D: adequate intake    The following portions of the patient's history were reviewed and updated as appropriate: allergies, current medications, past family history, past medical history, past social history, past surgical history, and problem  "list.    Review of Systems   All other systems reviewed and are negative.      Review of Systems   Constitutional: Negative for fatigue.   Respiratory: Negative for shortness of breath.    Gastrointestinal: Negative for abdominal pain.   Genitourinary: Negative for dysuria.   Neurological: Negative for headaches.   Psychiatric/Behavioral: Negative for dysphoric mood.         Objective   Physical Exam    /70   Ht 175.3 cm (69\")   Wt 74.8 kg (165 lb)   LMP 02/26/2024 (Approximate)   BMI 24.37 kg/m²   General:   alert, comfortable, and no distress   Heart: regular rate and rhythm   Lungs: clear to auscultation bilaterally   Breast: normal appearance, no masses or tenderness, Inspection negative, No nipple retraction or dimpling, No nipple discharge or bleeding, No axillary or supraclavicular adenopathy, Normal to palpation without dominant masses   Neck: no adenopathy and no carotid bruit   Abdomen: soft, non-tender. Bowel sounds normal. No masses,  no organomegaly   CVA: Not performed today   Pelvis: External genitalia: normal general appearance  Vaginal: normal mucosa without prolapse or lesions  Cervix: normal appearance  Adnexa: normal bimanual exam  Uterus: normal single, nontender   Extremities: Not performed today   Neurologic: negative   Psychiatric: Normal affect, judgement, and mood     Assessment & Plan   Diagnoses and all orders for this visit:    1. Well woman exam with routine gynecological exam (Primary)    2. Perimenopausal symptom    Other orders  -     Progesterone (Prometrium) 100 MG capsule; Take 1 capsule by mouth Daily.  Dispense: 30 capsule; Refill: 3        All questions answered.  Breast self exam technique reviewed and patient encouraged to perform self-exam monthly.  Discussed healthy lifestyle modifications.  Recommended 30 minutes of aerobic exercise five times per week.  Discussed calcium needs to prevent osteoporosis.    Pap utd  Mammogram utd  Will check pelvic u/s with Dr Mayorga " and discuss tx options for heavy bleeding  Will try a trial of prometirum cyclically to see if that helps her perimenopause sxs. May cause breast tenderness and mood changes. Could consider alt options as well (low dose bcp).

## 2024-04-10 ENCOUNTER — OFFICE VISIT (OUTPATIENT)
Dept: OBSTETRICS AND GYNECOLOGY | Age: 49
End: 2024-04-10
Payer: COMMERCIAL

## 2024-04-10 VITALS
DIASTOLIC BLOOD PRESSURE: 72 MMHG | SYSTOLIC BLOOD PRESSURE: 120 MMHG | HEIGHT: 69 IN | WEIGHT: 163.2 LBS | BODY MASS INDEX: 24.17 KG/M2

## 2024-04-10 DIAGNOSIS — N92.0 MENORRHAGIA WITH REGULAR CYCLE: Primary | ICD-10-CM

## 2024-04-10 PROCEDURE — 99213 OFFICE O/P EST LOW 20 MIN: CPT | Performed by: STUDENT IN AN ORGANIZED HEALTH CARE EDUCATION/TRAINING PROGRAM

## 2024-04-10 RX ORDER — PROGESTERONE 100 MG/1
100 CAPSULE ORAL
Qty: 30 CAPSULE | Refills: 3 | Status: SHIPPED | OUTPATIENT
Start: 2024-04-10

## 2024-04-10 NOTE — PROGRESS NOTES
[facility]   Obstetrics and Gynecology     4/10/2024      Patient:  Christina Garcia   MR#:[id number]    Office note    Chief Complaint   Patient presents with    Menstrual Problem     Heavy bleeding during periods follow up U/S today       Subjective     History of Present Illness  48 y.o. female  presents with AUB.  Reports regular monthly menses with very heavy bleeding for the first few days.  Menses last 7-10 days and occur q25 days.  Started cyclic prometrium 100mg qHS on 24, cycle day 15.  Also having some sleep difficulties and mood lability.  She has been doing some outside menopause research and is excited to try progesterone.    Relevant data reviewed:  US Non-ob Transvaginal (04/10/2024 12:33)   TSH (2023 06:53)     Patient Active Problem List   Diagnosis    Family history of colon cancer in mother    Family history of polyps in the colon    Shoulder impingement syndrome, left    Sciatica    Incomplete rotator cuff tear or rupture of right shoulder, not specified as traumatic    Acromioclavicular joint pain    Genital HSV    Eczema    Biceps tendinitis, left    Arthritis of left acromioclavicular joint    Panic attack    Chronic low back pain    Heart murmur    Lumbar radiculopathy    Generalized anxiety disorder    Pure hypercholesterolemia    Herniation of lumbar intervertebral disc with radiculopathy    Spinal stenosis of lumbar region with neurogenic claudication    Spondylolisthesis at L4-L5 level    Hyperreflexia    Clonus    Degeneration of intervertebral disc at C5-C6 level       Past Medical History:   Diagnosis Date    Abnormal mammogram 2016    Abnormal Pap smear of cervix 2009    Abnormal uterine bleeding (AUB) 2020    Acromioclavicular joint pain 2020    Anxiety     Arthritis of left acromioclavicular joint 2020    Back pain 07/15/2022    Biceps tendinitis, left 2020    Chest pain 2002    SEEN AT [facility]    Dermatitis  2018    SEEN AT [facility]    Eczema 2021    Fatigue 10/2014    Genital HSV 2021    Heart murmur     Herpes     HGSIL on Pap smear of cervix 2009    Hot flashes 07/15/2022    HPV in female     Inconclusive mammogram 10/2014    Irregular menses 2020    Left leg pain 2018    SEEN AT [facility]    Left shoulder pain 2021    Low back pain radiating to left leg 2016    Lumbago 2009    Lump of breast, right 10/2013    Malaise and fatigue 10/2006    Menorrhagia with regular cycle     Migraines     Moderate dysplasia of cervix 2009    Nontraumatic incomplete tear of right rotator cuff 2020    FOLLOWED BY DR.RYAN GARCES    OA (osteoarthritis)     Pharyngitis 2009    Radicular low back pain 2016    Sacroiliac joint dysfunction of left side 2015    Sciatica 07/10/2013    Shoulder impingement syndrome, left 2020    Sprain of shoulder 2008    SEEN AT [facility]    Superficial injury of cornea 2007    SEEN AT [facility] ER    URI (upper respiratory infection) 2022     Past Surgical History:   Procedure Laterality Date    BREAST CYST ASPIRATION Right 10/15/2013    DR. SHIRA MILAN AT [facility] IN [place]    COLONOSCOPY N/A 2021    ENTIRE COLON WNL, RESCOPE IN 5 YRS, DR. SANTA BONE AT [facility]    COLPOSCOPY N/A 2009    MODERATE DYSPLASIA    INTRAUTERINE DEVICE INSERTION N/A 2013    MIRENA, REPLACED 2011,  IUD FELL OUT    INTRAUTERINE DEVICE INSERTION N/A 2019    DR. FERNANDA EVANS, REMOVED 2020    LEEP N/A 2009    CIN3, POSITIVE MARGINS    MAMMO BILATERAL      VAGINAL DELIVERY N/A 1995    VAGINAL DELIVERY N/A 1996    DR. RODRIGUEZ    VAGINAL DELIVERY N/A 02/10/1999    DR. RODRIGUEZ    WISDOM TOOTH EXTRACTION Bilateral      Obstetric History:  OB History          3    Para   3    Term   3            AB        Living   3         SAB        IAB        Ectopic        Molar         Multiple        Live Births   3               Menstrual History:     Patient's last menstrual period was 03/25/2024 (approximate).       # 1 - Date: 1995, Sex: Female, Weight: 3572 g (7 lb 14 oz), GA: None, Type: Vaginal, Spontaneous, Apgar1: None, Apgar5: None, Living: Living, Birth Comments: None    # 2 - Date: 1996, Sex: Female, Weight: 3629 g (8 lb), GA: None, Type: Vaginal, Spontaneous, Apgar1: None, Apgar5: None, Living: Living, Birth Comments: None    # 3 - Date: 1999, Sex: Male, Weight: 4082 g (9 lb), GA: None, Type: Vaginal, Spontaneous, Apgar1: None, Apgar5: None, Living: Living, Birth Comments: None    Family History   Problem Relation Age of Onset    Diabetes Mother     Colon cancer Mother     Cancer Mother     Cancer Maternal Grandmother     Breast cancer Maternal Grandmother     Cancer Maternal Grandfather     Melanoma Maternal Grandfather     Colon polyps Father     Cancer Maternal Uncle     Colon cancer Maternal Uncle     Ovarian cancer Neg Hx     Uterine cancer Neg Hx     Malig Hyperthermia Neg Hx      Social History     Tobacco Use    Smoking status: Former     Passive exposure: Never    Smokeless tobacco: Never   Vaping Use    Vaping status: Never Used   Substance Use Topics    Alcohol use: Yes     Comment: RARELY    Drug use: No     Codeine and Azithromycin    Current Outpatient Medications:     meloxicam (MOBIC) 15 MG tablet, Take 1 tablet by mouth Daily with food., Disp: 30 tablet, Rfl: 1    Progesterone (Prometrium) 100 MG capsule, Take 1 capsule by mouth every night at bedtime on cycle days 15-25, Disp: 30 capsule, Rfl: 3    sertraline (ZOLOFT) 50 MG tablet, Take 3 tablets by mouth Daily., Disp: 270 tablet, Rfl: 1    valACYclovir (VALTREX) 500 MG tablet, Take 1 tablet by mouth two times a day for 3 days per outbreak, Disp: 18 tablet, Rfl: 2    The following portions of the patient's history were reviewed and updated as appropriate: allergies, current medications, past family history, past  "medical history, past social history, past surgical history, and problem list.    Review of Systems   All other systems reviewed and are negative.      BP Readings from Last 3 Encounters:   04/10/24 120/72   03/18/24 124/70   02/01/24 126/74      Wt Readings from Last 3 Encounters:   04/10/24 74 kg (163 lb 3.2 oz)   03/18/24 74.8 kg (165 lb)   02/01/24 71.2 kg (157 lb)      BMI: Estimated body mass index is 24.1 kg/m² as calculated from the following:    Height as of this encounter: 175.3 cm (69\").    Weight as of this encounter: 74 kg (163 lb 3.2 oz). BSA: Estimated body surface area is 1.89 meters squared as calculated from the following:    Height as of this encounter: 175.3 cm (69\").    Weight as of this encounter: 74 kg (163 lb 3.2 oz).    Objective   Physical Exam  Vitals and nursing note reviewed.   Constitutional:       General: She is not in acute distress.     Appearance: Normal appearance.   Pulmonary:      Effort: Pulmonary effort is normal. No respiratory distress.   Neurological:      General: No focal deficit present.      Mental Status: She is alert and oriented to person, place, and time.   Psychiatric:         Mood and Affect: Mood normal.         Behavior: Behavior normal.         Thought Content: Thought content normal.         Judgment: Judgment normal.         Assessment & Plan     Diagnoses and all orders for this visit:    1. Menorrhagia with regular cycle (Primary)    Other orders  -     Progesterone (Prometrium) 100 MG capsule; Take 1 capsule by mouth every night at bedtime on cycle days 15-25  Dispense: 30 capsule; Refill: 3    -Reviewed today's ultrasound which is overall normal.  Endometrium 12 mm and trilaminar.  Suspect she will menstruate soon.  - Discussed ways of reducing volume of menses, mainly progesterone therapy versus surgical intervention with endometrial ablation or hysterectomy.  Patient is interested in cyclic progesterone.  Discussed taking 1 capsule at bedtime on cycle " days 15 through 25.  Discussed taking for full 10 days in the first 3 months regardless of whether or not she starts bleeding.    Return in about 3 months (around 7/10/2024) for F/u AUB.    I spent 20 minutes caring for Christina Garcia on this date of service. This time includes time spent by me in the following activities: preparing for the visit, reviewing tests, obtaining and/or reviewing a separately obtained history, performing a medically appropriate examination and/or evaluation, counseling and educating the patient/family/caregiver, ordering medications, tests, or procedures and documenting information in the medical record.  This time does NOT include time spent on separately reported services.    Ilda Mayorga MD   4/10/2024 13:28 EDT

## 2024-05-22 RX ORDER — MELOXICAM 15 MG/1
15 TABLET ORAL DAILY
Qty: 30 TABLET | Refills: 1 | Status: CANCELLED | OUTPATIENT
Start: 2024-05-22

## 2024-06-19 ENCOUNTER — OFFICE VISIT (OUTPATIENT)
Dept: OBSTETRICS AND GYNECOLOGY | Age: 49
End: 2024-06-19
Payer: COMMERCIAL

## 2024-06-19 VITALS
DIASTOLIC BLOOD PRESSURE: 84 MMHG | SYSTOLIC BLOOD PRESSURE: 124 MMHG | WEIGHT: 161.4 LBS | HEIGHT: 69 IN | BODY MASS INDEX: 23.91 KG/M2

## 2024-06-19 DIAGNOSIS — N92.0 MENORRHAGIA WITH REGULAR CYCLE: Primary | ICD-10-CM

## 2024-06-19 DIAGNOSIS — N95.1 VASOMOTOR SYMPTOMS DUE TO MENOPAUSE: ICD-10-CM

## 2024-06-19 PROBLEM — F41.0 PANIC ATTACK: Status: RESOLVED | Noted: 2023-08-01 | Resolved: 2024-06-19

## 2024-06-19 PROBLEM — M54.16 LUMBAR RADICULOPATHY: Status: RESOLVED | Noted: 2023-01-16 | Resolved: 2024-06-19

## 2024-06-19 PROBLEM — M50.322 DEGENERATION OF INTERVERTEBRAL DISC AT C5-C6 LEVEL: Status: RESOLVED | Noted: 2024-02-01 | Resolved: 2024-06-19

## 2024-06-19 PROBLEM — M19.012 ARTHRITIS OF LEFT ACROMIOCLAVICULAR JOINT: Status: RESOLVED | Noted: 2020-05-18 | Resolved: 2024-06-19

## 2024-06-19 PROBLEM — M48.062 SPINAL STENOSIS OF LUMBAR REGION WITH NEUROGENIC CLAUDICATION: Status: RESOLVED | Noted: 2024-01-08 | Resolved: 2024-06-19

## 2024-06-19 PROBLEM — M75.111 INCOMPLETE ROTATOR CUFF TEAR OR RUPTURE OF RIGHT SHOULDER, NOT SPECIFIED AS TRAUMATIC: Status: RESOLVED | Noted: 2020-05-18 | Resolved: 2024-06-19

## 2024-06-19 PROBLEM — A60.00 GENITAL HSV: Status: RESOLVED | Noted: 2021-02-18 | Resolved: 2024-06-19

## 2024-06-19 PROBLEM — R29.2 HYPERREFLEXIA: Status: RESOLVED | Noted: 2024-01-08 | Resolved: 2024-06-19

## 2024-06-19 PROBLEM — M54.50 CHRONIC LOW BACK PAIN: Status: RESOLVED | Noted: 2023-01-16 | Resolved: 2024-06-19

## 2024-06-19 PROBLEM — M75.42 SHOULDER IMPINGEMENT SYNDROME, LEFT: Status: RESOLVED | Noted: 2020-05-18 | Resolved: 2024-06-19

## 2024-06-19 PROBLEM — M43.16 SPONDYLOLISTHESIS AT L4-L5 LEVEL: Status: RESOLVED | Noted: 2024-01-08 | Resolved: 2024-06-19

## 2024-06-19 PROBLEM — M51.16 HERNIATION OF LUMBAR INTERVERTEBRAL DISC WITH RADICULOPATHY: Status: RESOLVED | Noted: 2024-01-08 | Resolved: 2024-06-19

## 2024-06-19 PROBLEM — M25.519 ACROMIOCLAVICULAR JOINT PAIN: Status: RESOLVED | Noted: 2020-05-18 | Resolved: 2024-06-19

## 2024-06-19 PROBLEM — M75.22 BICEPS TENDINITIS, LEFT: Status: RESOLVED | Noted: 2020-05-18 | Resolved: 2024-06-19

## 2024-06-19 PROBLEM — Z80.0 FAMILY HISTORY OF COLON CANCER IN MOTHER: Status: RESOLVED | Noted: 2021-01-21 | Resolved: 2024-06-19

## 2024-06-19 PROBLEM — Z83.719 FAMILY HISTORY OF POLYPS IN THE COLON: Status: RESOLVED | Noted: 2021-01-21 | Resolved: 2024-06-19

## 2024-06-19 PROBLEM — L30.9 ECZEMA: Status: RESOLVED | Noted: 2021-02-18 | Resolved: 2024-06-19

## 2024-06-19 PROBLEM — G89.29 CHRONIC LOW BACK PAIN: Status: RESOLVED | Noted: 2023-01-16 | Resolved: 2024-06-19

## 2024-06-19 PROBLEM — R25.8 CLONUS: Status: RESOLVED | Noted: 2024-01-08 | Resolved: 2024-06-19

## 2024-06-19 PROCEDURE — 99214 OFFICE O/P EST MOD 30 MIN: CPT | Performed by: STUDENT IN AN ORGANIZED HEALTH CARE EDUCATION/TRAINING PROGRAM

## 2024-06-19 RX ORDER — PROGESTERONE 100 MG/1
100 CAPSULE ORAL
Qty: 30 CAPSULE | Refills: 3 | Status: SHIPPED | OUTPATIENT
Start: 2024-06-19

## 2024-06-19 RX ORDER — ESTRADIOL 0.05 MG/D
1 PATCH TRANSDERMAL WEEKLY
Qty: 8 PATCH | Refills: 6 | Status: SHIPPED | OUTPATIENT
Start: 2024-06-19

## 2024-06-19 NOTE — PROGRESS NOTES
[facility]   Obstetrics and Gynecology     2024      Patient:  Christina Garcia   MR#:[id number]    Office note    Chief Complaint   Patient presents with    Follow-up     F/U AUB, no problems today        Subjective     History of Present Illness  48 y.o. female  presents for follow-up of AUB.  Patient started cyclic Prometrium at last visit 2 months ago.  She takes 100 mg on cycle days 15 through 25.  She is liking it.  Menses have reduced significantly.  Feels like anger has improved.  She has noticed intermittent hot flashes and persistence of brain fog.  Also has no libido.     has vasectomy.    Of note, patient has h/o AL 3 s/p LEEP with positive margins in , normal paps since  IGP, Aptima HPV, Rfx 16 / 18,45 (2023 16:24) NIL, HPV neg    Patient Active Problem List   Diagnosis    Heart murmur    Generalized anxiety disorder    Pure hypercholesterolemia       Past Medical History:   Diagnosis Date    Abnormal mammogram 2016    Anxiety     Arthritis of left acromioclavicular joint 2020    Biceps tendinitis, left 2020    Dermatitis 2018    SEEN AT [facility]    Eczema 2021    Genital HSV 2021    Heart murmur     Herpes     HGSIL on Pap smear of cervix 2009    Hot flashes 07/15/2022    HPV in female     Low back pain radiating to left leg 2016    Lumbago 2009    Lump of breast, right 10/2013    Malaise and fatigue 10/2006    Menorrhagia with regular cycle     Migraines     Nontraumatic incomplete tear of right rotator cuff 2020    FOLLOWED BY DR.RYAN GARCES    OA (osteoarthritis)     Pharyngitis 2009    Radicular low back pain 2016    Sacroiliac joint dysfunction of left side 2015    Sciatica 07/10/2013    Shoulder impingement syndrome, left 2020    Sprain of shoulder 2008    SEEN AT [facility]    Superficial injury of cornea 2007    SEEN AT [facility] ER    URI (upper respiratory infection)  2022     Past Surgical History:   Procedure Laterality Date    BREAST CYST ASPIRATION Right 10/15/2013    DR. SHIRA MILAN AT [facility] IN [place]    COLONOSCOPY N/A 2021    ENTIRE COLON WNL, RESCOPE IN 5 YRS, DR. SANTA BONE AT [facility]    COLPOSCOPY N/A 2009    MODERATE DYSPLASIA    INTRAUTERINE DEVICE INSERTION N/A 2013    MIRENA, REPLACED 2011,  IUD FELL OUT    INTRAUTERINE DEVICE INSERTION N/A 2019    DR. FERNANDA EVANS, REMOVED 2020    LEEP N/A 2009    CIN3, POSITIVE MARGINS    MAMMO BILATERAL      VAGINAL DELIVERY N/A 1995    VAGINAL DELIVERY N/A 1996    DR. RODRIGUEZ    VAGINAL DELIVERY N/A 02/10/1999    DR. RODRIGUEZ    WISDOM TOOTH EXTRACTION Bilateral      Obstetric History:  OB History          3    Para   3    Term   3            AB        Living   3         SAB        IAB        Ectopic        Molar        Multiple        Live Births   3               Menstrual History:     Patient's last menstrual period was 2024 (approximate).       # 1 - Date: , Sex: Female, Weight: 3572 g (7 lb 14 oz), GA: None, Type: Vaginal, Spontaneous, Apgar1: None, Apgar5: None, Living: Living, Birth Comments: None    # 2 - Date: , Sex: Female, Weight: 3629 g (8 lb), GA: None, Type: Vaginal, Spontaneous, Apgar1: None, Apgar5: None, Living: Living, Birth Comments: None    # 3 - Date: , Sex: Male, Weight: 4082 g (9 lb), GA: None, Type: Vaginal, Spontaneous, Apgar1: None, Apgar5: None, Living: Living, Birth Comments: None    Family History   Problem Relation Age of Onset    Diabetes Mother     Colon cancer Mother     Cancer Mother     Cancer Maternal Grandmother     Breast cancer Maternal Grandmother     Cancer Maternal Grandfather     Melanoma Maternal Grandfather     Colon polyps Father     Cancer Maternal Uncle     Colon cancer Maternal Uncle     Ovarian cancer Neg Hx     Uterine cancer Neg Hx     Malig Hyperthermia Neg Hx  "     Social History     Tobacco Use    Smoking status: Former     Passive exposure: Never    Smokeless tobacco: Never   Vaping Use    Vaping status: Never Used   Substance Use Topics    Alcohol use: Not Currently     Comment: RARELY    Drug use: No     Codeine and Azithromycin    Current Outpatient Medications:     meloxicam (MOBIC) 15 MG tablet, Take 1 tablet by mouth Daily. Give with food., Disp: 30 tablet, Rfl: 0    Progesterone (Prometrium) 100 MG capsule, Take 1 capsule by mouth every night at bedtime on cycle days 15-25, Disp: 30 capsule, Rfl: 3    sertraline (ZOLOFT) 50 MG tablet, Take 3 tablets by mouth Daily., Disp: 270 tablet, Rfl: 1    valACYclovir (VALTREX) 500 MG tablet, Take 1 tablet by mouth two times a day for 3 days per outbreak, Disp: 18 tablet, Rfl: 2    estradiol (CLIMARA) 0.05 MG/24HR patch, Place 1 patch on the skin as directed by provider 1 (One) Time Per Week., Disp: 8 patch, Rfl: 6    The following portions of the patient's history were reviewed and updated as appropriate: allergies, current medications, past family history, past medical history, past social history, past surgical history, and problem list.    Review of Systems   All other systems reviewed and are negative.      BP Readings from Last 3 Encounters:   06/19/24 124/84   04/10/24 120/72   03/18/24 124/70      Wt Readings from Last 3 Encounters:   06/19/24 73.2 kg (161 lb 6.4 oz)   04/10/24 74 kg (163 lb 3.2 oz)   03/18/24 74.8 kg (165 lb)      BMI: Estimated body mass index is 23.83 kg/m² as calculated from the following:    Height as of this encounter: 175.3 cm (69\").    Weight as of this encounter: 73.2 kg (161 lb 6.4 oz). BSA: Estimated body surface area is 1.89 meters squared as calculated from the following:    Height as of this encounter: 175.3 cm (69\").    Weight as of this encounter: 73.2 kg (161 lb 6.4 oz).    Objective   Physical Exam  Vitals and nursing note reviewed.   Constitutional:       General: She is not in " acute distress.     Appearance: Normal appearance.   Pulmonary:      Effort: Pulmonary effort is normal. No respiratory distress.   Neurological:      General: No focal deficit present.      Mental Status: She is alert and oriented to person, place, and time.   Psychiatric:         Mood and Affect: Mood normal.         Behavior: Behavior normal.         Thought Content: Thought content normal.         Judgment: Judgment normal.         Assessment & Plan     Diagnoses and all orders for this visit:    1. Menorrhagia with regular cycle (Primary)    2. Vasomotor symptoms due to menopause    Other orders  -     estradiol (CLIMARA) 0.05 MG/24HR patch; Place 1 patch on the skin as directed by provider 1 (One) Time Per Week.  Dispense: 8 patch; Refill: 6  -     Progesterone (Prometrium) 100 MG capsule; Take 1 capsule by mouth every night at bedtime on cycle days 15-25  Dispense: 30 capsule; Refill: 3    -Menorrhagia has greatly improved with cyclic progesterone.  She has also noticed some improved systemic effects like decreased irritability and improved sleep.  She would like to continue this regimen.  Refill provided.  - Discussed addition of low-dose estrogen patch for treatment of hot flashes.  We also discussed the option of oral OCP as a combination low-dose HRT.  She declined and would prefer to try patch.  Discussed applying 1 patch per week anywhere besides breasts.  Follow-up in 6 to 8 weeks to evaluate symptoms.    Return in about 8 weeks (around 8/14/2024) for F/u HRT.    I spent 30 minutes caring for Christina Garcia on this date of service. This time includes time spent by me in the following activities: preparing for the visit, reviewing tests, obtaining and/or reviewing a separately obtained history, performing a medically appropriate examination and/or evaluation, counseling and educating the patient/family/caregiver, ordering medications, tests, or procedures and documenting information in the medical  record.  This time does NOT include time spent on separately reported services.    Ilda Mayorga MD   6/19/2024 17:49 EDT

## 2024-08-22 ENCOUNTER — OFFICE VISIT (OUTPATIENT)
Dept: OBSTETRICS AND GYNECOLOGY | Age: 49
End: 2024-08-22
Payer: COMMERCIAL

## 2024-08-22 VITALS
DIASTOLIC BLOOD PRESSURE: 72 MMHG | BODY MASS INDEX: 24.5 KG/M2 | HEIGHT: 69 IN | WEIGHT: 165.4 LBS | SYSTOLIC BLOOD PRESSURE: 124 MMHG

## 2024-08-22 DIAGNOSIS — N95.1 VASOMOTOR SYMPTOMS DUE TO MENOPAUSE: Primary | ICD-10-CM

## 2024-08-22 PROCEDURE — 99213 OFFICE O/P EST LOW 20 MIN: CPT | Performed by: STUDENT IN AN ORGANIZED HEALTH CARE EDUCATION/TRAINING PROGRAM

## 2024-08-22 NOTE — PROGRESS NOTES
[facility]   Obstetrics and Gynecology     2024      Patient:  Christina Garcia   MR#:[id number]    Office note    Chief Complaint   Patient presents with    Follow-up     HRT follow up, Not using patches       Subjective     History of Present Illness  49 y.o. female  presents for follow-up of HRT.  She has been taking cyclic progesterone on cycle days 15 through 25.  Menses have reduced significantly.  She also feels that irritability improved significantly.  She then developed hot flashes and brain fog as well as decreased libido so opted to try estrogen patches.  She used it for 4-6 weeks but felt very bloated so stopped it 3-4 weeks ago.  She feels like bloating has improved somewhat since cessation.  She feels most frustrated that she has been unable to lose weight this year.  She is on a structured diet and exercise program and works very hard at this.  She will be able to lose some weight temporarily but then it comes right back.     has vasectomy.       Patient Active Problem List   Diagnosis    Heart murmur    Generalized anxiety disorder    Pure hypercholesterolemia       Past Medical History:   Diagnosis Date    Abnormal mammogram 2016    Anxiety     Arthritis of left acromioclavicular joint 2020    Biceps tendinitis, left 2020    Dermatitis 2018    SEEN AT [facility]    Eczema 2021    Genital HSV 2021    Heart murmur     Herpes     HGSIL on Pap smear of cervix 2009    Hot flashes 07/15/2022    HPV in female     Low back pain radiating to left leg 2016    Lumbago 2009    Lump of breast, right 10/2013    Malaise and fatigue 10/2006    Menorrhagia with regular cycle     Migraines     Nontraumatic incomplete tear of right rotator cuff 2020    FOLLOWED BY DR.RYAN GARCES    OA (osteoarthritis)     Pharyngitis 2009    Radicular low back pain 2016    Sacroiliac joint dysfunction of left side 2015    Sciatica 07/10/2013     Shoulder impingement syndrome, left 2020    Sprain of shoulder 2008    SEEN AT [facility]    Superficial injury of cornea 2007    SEEN AT [facility] ER    URI (upper respiratory infection) 2022     Past Surgical History:   Procedure Laterality Date    BREAST CYST ASPIRATION Right 10/15/2013    DR. SHIRA MILAN AT [facility] IN [place]    COLONOSCOPY N/A 2021    ENTIRE COLON WNL, RESCOPE IN 5 YRS, DR. SANTA BONE AT [facility]    COLPOSCOPY N/A 2009    MODERATE DYSPLASIA    INTRAUTERINE DEVICE INSERTION N/A 2013    MIRENA, REPLACED 2011,  IUD FELL OUT    INTRAUTERINE DEVICE INSERTION N/A 2019    DR. FERNANDA EVANS, REMOVED 2020    LEEP N/A 2009    CIN3, POSITIVE MARGINS    MAMMO BILATERAL      VAGINAL DELIVERY N/A 1995    VAGINAL DELIVERY N/A 1996    DR. RODRIGUEZ    VAGINAL DELIVERY N/A 02/10/1999    DR. RODRIGUEZ    WISDOM TOOTH EXTRACTION Bilateral      Obstetric History:  OB History          3    Para   3    Term   3            AB        Living   3         SAB        IAB        Ectopic        Molar        Multiple        Live Births   3               Menstrual History:     Patient's last menstrual period was 2024 (exact date).       # 1 - Date: , Sex: Female, Weight: 3572 g (7 lb 14 oz), GA: None, Type: Vaginal, Spontaneous, Apgar1: None, Apgar5: None, Living: Living, Birth Comments: None    # 2 - Date: , Sex: Female, Weight: 3629 g (8 lb), GA: None, Type: Vaginal, Spontaneous, Apgar1: None, Apgar5: None, Living: Living, Birth Comments: None    # 3 - Date: , Sex: Male, Weight: 4082 g (9 lb), GA: None, Type: Vaginal, Spontaneous, Apgar1: None, Apgar5: None, Living: Living, Birth Comments: None    Family History   Problem Relation Age of Onset    Diabetes Mother     Colon cancer Mother     Cancer Mother     Cancer Maternal Grandmother     Breast cancer Maternal Grandmother     Cancer Maternal  "Grandfather     Melanoma Maternal Grandfather     Colon polyps Father     Cancer Maternal Uncle     Colon cancer Maternal Uncle     Ovarian cancer Neg Hx     Uterine cancer Neg Hx     Malig Hyperthermia Neg Hx      Social History     Tobacco Use    Smoking status: Former     Passive exposure: Never    Smokeless tobacco: Never   Vaping Use    Vaping status: Never Used   Substance Use Topics    Alcohol use: Not Currently     Comment: RARELY    Drug use: No     Codeine and Azithromycin    Current Outpatient Medications:     meloxicam (MOBIC) 15 MG tablet, Take 1 tablet by mouth Daily. Give with food., Disp: 30 tablet, Rfl: 0    Progesterone (Prometrium) 100 MG capsule, Take 1 capsule by mouth every night at bedtime on cycle days 15-25, Disp: 30 capsule, Rfl: 3    sertraline (ZOLOFT) 50 MG tablet, Take 3 tablets by mouth Daily., Disp: 270 tablet, Rfl: 1    valACYclovir (VALTREX) 500 MG tablet, Take 1 tablet by mouth two times a day for 3 days per outbreak, Disp: 18 tablet, Rfl: 2    The following portions of the patient's history were reviewed and updated as appropriate: allergies, current medications, past family history, past medical history, past social history, past surgical history, and problem list.    Review of Systems   All other systems reviewed and are negative.      BP Readings from Last 3 Encounters:   08/22/24 124/72   06/19/24 124/84   04/10/24 120/72      Wt Readings from Last 3 Encounters:   08/22/24 75 kg (165 lb 6.4 oz)   06/19/24 73.2 kg (161 lb 6.4 oz)   04/10/24 74 kg (163 lb 3.2 oz)      BMI: Estimated body mass index is 24.43 kg/m² as calculated from the following:    Height as of this encounter: 175.3 cm (69\").    Weight as of this encounter: 75 kg (165 lb 6.4 oz). BSA: Estimated body surface area is 1.91 meters squared as calculated from the following:    Height as of this encounter: 175.3 cm (69\").    Weight as of this encounter: 75 kg (165 lb 6.4 oz).    Objective   Physical Exam  Vitals and " nursing note reviewed.   Constitutional:       General: She is not in acute distress.     Appearance: Normal appearance.   Pulmonary:      Effort: Pulmonary effort is normal. No respiratory distress.   Neurological:      General: No focal deficit present.      Mental Status: She is alert and oriented to person, place, and time.   Psychiatric:         Mood and Affect: Mood normal.         Behavior: Behavior normal.         Thought Content: Thought content normal.         Judgment: Judgment normal.         Assessment & Plan     Diagnoses and all orders for this visit:    1. Vasomotor symptoms due to menopause (Primary)    -We discussed that estrogen best addresses vasomotor symptoms.  These have been minimal in the last month so there is no need to continue.   -Patient's most frustrating symptom is weight gain.  Discussed that this occurs commonly in this age group because of normal physiologic changes that happen in the perimenopausal period.  Unfortunately there is no intervention that addresses this beyond diet and exercise.  I am in full support of her experimenting with various forms of HRT to see how they affect her but estrogen does not seem to be helping at this time.  She does plan to continue progesterone.    Return in about 8 months (around 4/22/2025), or if symptoms worsen or fail to improve, for Annual.    I spent 20 minutes caring for Christina Garcia on this date of service. This time includes time spent by me in the following activities: preparing for the visit, reviewing tests, obtaining and/or reviewing a separately obtained history, performing a medically appropriate examination and/or evaluation, counseling and educating the patient/family/caregiver, ordering medications, tests, or procedures and documenting information in the medical record.  This time does NOT include time spent on separately reported services.    Ilda Mayorga MD   8/22/2024 16:15 EDT

## 2024-09-10 DIAGNOSIS — F41.1 GENERALIZED ANXIETY DISORDER: ICD-10-CM

## 2024-10-18 NOTE — PROGRESS NOTES
Subjective   Patient ID: Christina Garcia is a 49 y.o. female is here today for follow-up     She is with her .  She has known spinal stenosis and spondylolisthesis.  About a year ago she had severe back and left leg pain which has settled down significantly.  She has baseline hyperreflexia which we worked up with a cervical MRI to make sure she did not have cord compression and there was none.  She is working on an exercise routine and is doing quite well.  Occasionally she has flareups of her left leg pain but they are not bad and things are manageable.  She has no motor deficits.  Will continue to follow her and I will see her in 1 year with x-rays, sooner if there is a bad flareup.    History of Present Illness    The following portions of the patient's history were reviewed and updated as appropriate: allergies, current medications, past family history, past medical history, past social history, past surgical history, and problem list.    Review of Systems   Constitutional:  Negative for fever.   Musculoskeletal:  Positive for back pain. Negative for gait problem.   Neurological:  Positive for weakness (IN THE LEFT LEG). Negative for dizziness, light-headedness, numbness and headaches.   All other systems reviewed and are negative.      Objective   Physical Exam  Constitutional:       General: She is awake.      Appearance: She is well-developed.   HENT:      Head: Normocephalic and atraumatic.   Eyes:      General: Lids are normal.      Extraocular Movements: Extraocular movements intact.      Conjunctiva/sclera: Conjunctivae normal.      Pupils: Pupils are equal, round, and reactive to light.   Neck:      Vascular: No carotid bruit.   Neurological:      Mental Status: She is alert.      Coordination: Coordination is intact.      Deep Tendon Reflexes:      Reflex Scores:       Tricep reflexes are 2+ on the right side and 2+ on the left side.       Bicep reflexes are 2+ on the right side and 2+ on the  left side.       Brachioradialis reflexes are 2+ on the right side and 2+ on the left side.       Patellar reflexes are 2+ on the right side and 2+ on the left side.       Achilles reflexes are 2+ on the right side and 2+ on the left side.  Psychiatric:         Speech: Speech normal.       Neurological Exam  Mental Status  Awake and alert. Oriented only to person, place, time and situation. Recent and remote memory are intact. Speech is normal. Language is fluent with no aphasia. Attention and concentration are normal. Fund of knowledge is appropriate for level of education.    Cranial Nerves  CN II: Visual acuity is normal. Visual fields full to confrontation.  CN III, IV, VI: Extraocular movements intact bilaterally. Normal lids and orbits bilaterally. Pupils equal round and reactive to light bilaterally.  CN V: Facial sensation is normal.  CN VII: Full and symmetric facial movement.  CN IX, X: Palate elevates symmetrically. Normal gag reflex.  CN XI: Shoulder shrug strength is normal.  CN XII: Tongue midline without atrophy or fasciculations.    Motor  Normal muscle bulk throughout. Normal muscle tone.                                               Right                     Left  Rhomboids                            5                          5  Infraspinatus                          5                          5  Supraspinatus                       5                          5  Deltoid                                   5                          5   Biceps                                   5                          5  Brachioradialis                      5                          5   Triceps                                  5                          5   Pronator                                5                          5   Supinator                              5                           5   Wrist flexor                            5                          5   Wrist extensor                       5                           5   Finger flexor                          5                          5   Finger extensor                     5                          5   Interossei                              5                          5   Abductor pollicis brevis         5                          5   Flexor pollicis brevis             5                          5   Opponens pollicis                 5                          5  Extensor digitorum               5                          5  Abductor digiti minimi           5                          5   Abdominal                            5                          5  Glutei                                    5                          5  Hip abductor                         5                          5  Hip adductor                         5                          5   Iliopsoas                               5                          5   Quadriceps                           5                          5   Hamstring                             5                          5   Gastrocnemius                     5                           5   Anterior tibialis                      5                          5   Posterior tibialis                    5                          5   Peroneal                               5                          5  Ankle dorsiflexor                   5                          5  Ankle plantar flexor              5                           5  Extensor hallucis longus      5                           5    Sensory  Light touch is normal in upper and lower extremities. Proprioception is normal in upper and lower extremities.     Reflexes                                            Right                      Left  Brachioradialis                    2+                         2+  Biceps                                 2+                         2+  Triceps                                2+                         2+  Finger flex                            2+                         2+  Hamstring                            2+                         2+  Patellar                                2+                         2+  Achilles                                2+                         2+    Coordination    Finger-to-nose, rapid alternating movements and heel-to-shin normal bilaterally without dysmetria.    Gait  Casual gait is normal including stance, stride, and arm swing.Normal toe walking. Normal heel walking. Normal tandem gait.       Assessment & Plan   Independent Review of Radiographic Studies:      The lumbar MRI done on 3/15/2023 does show spinal stenosis at L4-L5 with a degenerative spondylolisthesis.  Agree with the report.      Medical Decision Making:    She is functional and doing all the right things in terms of exercise activities.  I will see her in 1 year with x-rays.  If there is a severe flareup between now and then, she will let me know.    Diagnoses and all orders for this visit:    1. Spondylolisthesis at L4-L5 level (Primary)  -     XR Spine Lumbar Complete With Flex & Ext; Future    2. Spinal stenosis of lumbar region with neurogenic claudication  -     XR Spine Lumbar Complete With Flex & Ext; Future    3. Herniation of lumbar intervertebral disc with radiculopathy    4. Hyperreflexia      No follow-ups on file.

## 2024-10-28 ENCOUNTER — OFFICE VISIT (OUTPATIENT)
Dept: NEUROSURGERY | Facility: CLINIC | Age: 49
End: 2024-10-28
Payer: COMMERCIAL

## 2024-10-28 VITALS
WEIGHT: 165 LBS | HEIGHT: 69 IN | SYSTOLIC BLOOD PRESSURE: 122 MMHG | RESPIRATION RATE: 20 BRPM | DIASTOLIC BLOOD PRESSURE: 80 MMHG | BODY MASS INDEX: 24.44 KG/M2

## 2024-10-28 DIAGNOSIS — M43.16 SPONDYLOLISTHESIS AT L4-L5 LEVEL: Primary | ICD-10-CM

## 2024-10-28 DIAGNOSIS — M51.16 HERNIATION OF LUMBAR INTERVERTEBRAL DISC WITH RADICULOPATHY: ICD-10-CM

## 2024-10-28 DIAGNOSIS — R29.2 HYPERREFLEXIA: ICD-10-CM

## 2024-10-28 DIAGNOSIS — M48.062 SPINAL STENOSIS OF LUMBAR REGION WITH NEUROGENIC CLAUDICATION: ICD-10-CM

## 2024-10-28 PROCEDURE — 99213 OFFICE O/P EST LOW 20 MIN: CPT | Performed by: NEUROLOGICAL SURGERY

## 2024-12-12 DIAGNOSIS — F41.1 GENERALIZED ANXIETY DISORDER: ICD-10-CM

## 2024-12-12 DIAGNOSIS — A60.00 HERPES SIMPLEX INFECTION OF GENITOURINARY SYSTEM: ICD-10-CM

## 2024-12-12 NOTE — TELEPHONE ENCOUNTER
Rx Refill Note  Requested Prescriptions     Pending Prescriptions Disp Refills    sertraline (ZOLOFT) 50 MG tablet 270 tablet 0     Sig: Take 3 tablets by mouth Daily.      Last office visit with prescribing clinician: 8/1/2023   Last telemedicine visit with prescribing clinician: Visit date not found   Next office visit with prescribing clinician: Visit date not found       Yue Vale  12/12/24, 08:20 EST

## 2024-12-14 RX ORDER — VALACYCLOVIR HYDROCHLORIDE 500 MG/1
500 TABLET, FILM COATED ORAL 2 TIMES DAILY
Qty: 18 TABLET | Refills: 2 | Status: SHIPPED | OUTPATIENT
Start: 2024-12-14

## 2024-12-14 RX ORDER — MELOXICAM 15 MG/1
15 TABLET ORAL DAILY
Qty: 30 TABLET | Refills: 3 | Status: SHIPPED | OUTPATIENT
Start: 2024-12-14

## 2024-12-14 NOTE — TELEPHONE ENCOUNTER
Rx Refill Note  Requested Prescriptions     Pending Prescriptions Disp Refills    meloxicam (MOBIC) 15 MG tablet 30 tablet 0     Sig: Take 1 tablet by mouth Daily. Give with food.      Last office visit with prescribing clinician: 10/28/2024   Last telemedicine visit with prescribing clinician: Visit date not found   Next office visit with prescribing clinician: 10/29/2025                         Would you like a call back once the refill request has been completed: [] Yes [x] No    If the office needs to give you a call back, can they leave a voicemail: [] Yes [x] No    Christine Carrasco MA  12/14/24, 09:08 EST

## 2025-03-26 DIAGNOSIS — F41.1 GENERALIZED ANXIETY DISORDER: ICD-10-CM

## 2025-04-10 ENCOUNTER — OFFICE VISIT (OUTPATIENT)
Dept: OBSTETRICS AND GYNECOLOGY | Age: 50
End: 2025-04-10
Payer: COMMERCIAL

## 2025-04-10 VITALS
HEIGHT: 69 IN | WEIGHT: 165.4 LBS | DIASTOLIC BLOOD PRESSURE: 60 MMHG | SYSTOLIC BLOOD PRESSURE: 116 MMHG | BODY MASS INDEX: 24.5 KG/M2

## 2025-04-10 DIAGNOSIS — Z12.31 SCREENING MAMMOGRAM FOR BREAST CANCER: ICD-10-CM

## 2025-04-10 DIAGNOSIS — Z12.4 SCREENING FOR CERVICAL CANCER: ICD-10-CM

## 2025-04-10 DIAGNOSIS — Z01.419 WELL WOMAN EXAM WITH ROUTINE GYNECOLOGICAL EXAM: Primary | ICD-10-CM

## 2025-04-10 NOTE — PROGRESS NOTES
[facility]   Obstetrics and Gynecology   Routine Annual Visit    4/10/2025    Patient: Christina Garcia          MR#:[id number]    History of Present Illness    Chief Complaint   Patient presents with    Annual Exam     AE today, Last AE 2024, Last pap 2023 w/pap nml and HPV neg, MG 2023, Colonoscopy 2021, No problems today       49 y.o. female  who presents for annual exam.  She recently started testosterone pellets and feels great.  Increased energy.  She also takes progesterone capsules.  Initially she was taking prometrium 200mg qHS in second half os cycle but now has added 100mg in first half of cycle.  She is still menstruating.  It became a little more irregular when she added Prometrium in the first half of cycle but this does not bother her.     has vasectomy.    Studies reviewed:  IGP, Aptima HPV, Rfx 16 / 18,45 (2023 16:24) NIL, HPV neg, h/o AL 3 with LEEP with pos margins in , normal paps since  Mammo Screening Digital Tomosynthesis Bilateral With CAD (2023 11:02) normal  ENDOSCOPY - COLON (2021) colonoscopy normal by Dr. Levi Lambert, repeat 5 yrs    Obstetric History:  OB History          3    Para   3    Term   3            AB        Living   3         SAB        IAB        Ectopic        Molar        Multiple        Live Births   3               Menstrual History:     Patient's last menstrual period was 2025 (exact date).       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    Heart murmur     Herpes     HGSIL on Pap smear of cervix 09/08/2009    Hot flashes 07/15/2022    HPV in female     Low back pain radiating to left leg 08/2016    Lumbago 09/2009    Lump of breast, right 10/2013    Malaise and fatigue 10/2006    Menorrhagia with regular cycle     Migraines     Nontraumatic incomplete tear of right rotator cuff 05/18/2020    FOLLOWED BY DR.RYAN GARCES    OA (osteoarthritis)     Pharyngitis 11/2009    Radicular low back pain 07/2016    Sacroiliac joint dysfunction of left side 05/2015    Sciatica 07/10/2013    Shoulder impingement syndrome, left 05/18/2020    Sprain of shoulder 02/23/2008    SEEN AT [facility]    Superficial injury of cornea 08/20/2007    SEEN AT [facility]    URI (upper respiratory infection) 09/20/2022     Past Surgical History:   Procedure Laterality Date    BREAST CYST ASPIRATION Right 10/15/2013    DR. SHIRA MILAN AT [facility] IN [place]    COLONOSCOPY N/A 02/25/2021    ENTIRE COLON WNL, RESCOPE IN 5 YRS, DR. SANTA BONE AT [facility]    COLPOSCOPY N/A 09/29/2009    MODERATE DYSPLASIA    INTRAUTERINE DEVICE INSERTION N/A 02/21/2013    MIRENA, REPLACED 06/22/2011, 11- IUD FELL OUT    INTRAUTERINE DEVICE INSERTION N/A 09/11/2019    DR. FERNANDA EVANS, REMOVED 09/11/2020    LEEP N/A 11/05/2009    CIN3, POSITIVE MARGINS    MAMMO BILATERAL      VAGINAL DELIVERY N/A 09/16/1995    VAGINAL DELIVERY N/A 11/21/1996    DR. RODRIGUEZ    VAGINAL DELIVERY N/A 02/10/1999    DR. RODRIGUEZ    WISDOM TOOTH EXTRACTION Bilateral 1997     Social History     Tobacco Use   Smoking Status Former    Passive exposure: Never   Smokeless Tobacco Never     Family History   Problem Relation Age of Onset    Diabetes Mother     Colon cancer Mother     Cancer Mother     Cancer Maternal Grandmother     Breast cancer Maternal Grandmother     Cancer Maternal  "Grandfather     Melanoma Maternal Grandfather     Colon polyps Father     Cancer Maternal Uncle     Colon cancer Maternal Uncle     Ovarian cancer Neg Hx     Uterine cancer Neg Hx     Malig Hyperthermia Neg Hx      Prior to Admission medications    Medication Sig Start Date End Date Taking? Authorizing Provider   meloxicam (MOBIC) 15 MG tablet Take 1 tablet by mouth Daily. Give with food. 12/14/24  Yes Miki Peterson MD   Progesterone (Prometrium) 100 MG capsule Take 1 capsule by mouth every night at bedtime on cycle days 15-25 6/19/24  Yes Ilda Mayorga MD   sertraline (ZOLOFT) 50 MG tablet Take 3 tablets by mouth Daily. 3/26/25  Yes Ana Cristnia Ridley MD   valACYclovir (VALTREX) 500 MG tablet Take 1 tablet by mouth two times a day for 3 days per outbreak 12/14/24  Yes Ana Cristina Ridley MD     ________________________________________    Current contraception: vasectomy  History of abnormal Pap smear: yes - see above  Family history of uterine or ovarian cancer: no  Family History of colon cancer/colon polyps: yes - see above  History of abnormal mammogram: no    The following portions of the patient's history were reviewed and updated as appropriate: allergies, current medications, past family history, past medical history, past social history, past surgical history, and problem list.    Review of Systems   All other systems reviewed and are negative.           Objective     /60   Ht 175.3 cm (69\")   Wt 75 kg (165 lb 6.4 oz)   LMP 03/21/2025 (Exact Date)   Breastfeeding No   BMI 24.43 kg/m²    BP Readings from Last 3 Encounters:   04/10/25 116/60   10/28/24 122/80   08/22/24 124/72      Wt Readings from Last 3 Encounters:   04/10/25 75 kg (165 lb 6.4 oz)   10/28/24 74.8 kg (165 lb)   08/22/24 75 kg (165 lb 6.4 oz)        BMI: Estimated body mass index is 24.43 kg/m² as calculated from the following:    Height as of this encounter: 175.3 cm (69\").    Weight as of this encounter: " 75 kg (165 lb 6.4 oz).    Physical Exam  Vitals and nursing note reviewed.   Constitutional:       General: She is not in acute distress.     Appearance: Normal appearance.   HENT:      Head: Normocephalic and atraumatic.   Eyes:      Extraocular Movements: Extraocular movements intact.   Cardiovascular:      Rate and Rhythm: Normal rate and regular rhythm.      Pulses: Normal pulses.      Heart sounds: No murmur heard.  Pulmonary:      Effort: Pulmonary effort is normal. No respiratory distress.      Breath sounds: Normal breath sounds.   Chest:   Breasts:     Right: Normal. No mass, nipple discharge, skin change or tenderness.      Left: Normal. No mass, nipple discharge, skin change or tenderness.   Abdominal:      General: There is no distension.      Palpations: Abdomen is soft. There is no mass.      Tenderness: There is no abdominal tenderness.   Genitourinary:     General: Normal vulva.      Labia:         Right: No rash or lesion.         Left: No rash or lesion.       Urethra: No prolapse, urethral swelling or urethral lesion.      Vagina: Normal.      Cervix: Normal.      Uterus: Normal.       Adnexa: Right adnexa normal and left adnexa normal.      Comments: Bladder: no masses or tenderness  Perineum/Anus: no masses, lesions, or skin changes  Musculoskeletal:         General: No swelling. Normal range of motion.      Cervical back: Normal range of motion.   Lymphadenopathy:      Upper Body:      Right upper body: No axillary adenopathy.      Left upper body: No axillary adenopathy.   Skin:     General: Skin is warm and dry.   Neurological:      General: No focal deficit present.      Mental Status: She is alert and oriented to person, place, and time.   Psychiatric:         Mood and Affect: Mood normal.         Behavior: Behavior normal.         As part of wellness and prevention, the following topics were discussed with the patient:  Encouraged self breast exam  Physical activity and regular exercised  encouraged.   Injury prevention discussed.  Healthy weight discussed.  Nutrition discussed.  Substance abuse/misuse discussed.  Sexual behavior/safe practices discussed.   Sexual transmitted disease prevention   Contraception discussed.   Mental health discussed.           Assessment:  Diagnoses and all orders for this visit:    1. Well woman exam with routine gynecological exam (Primary)  -     Mammo Screening Digital Tomosynthesis Bilateral With CAD; Future  -     IGP, Apt HPV,rfx 16 / 18,45    2. Screening mammogram for breast cancer  -     Mammo Screening Digital Tomosynthesis Bilateral With CAD; Future    3. Screening for cervical cancer  -     IGP, Apt HPV,rfx 16 / 18,45      -Breast and pelvic exam normal  - Pap today  - Declined STD screen  - Mammogram ordered  - Colonoscopy up-to-date, due for repeat next year    Plan:  Return for mammo now and annual/mammo 1 yr.      Ilda Mayorga MD  4/10/2025 15:53 EDT

## 2025-04-15 LAB
CYTOLOGIST CVX/VAG CYTO: ABNORMAL
CYTOLOGY CVX/VAG DOC CYTO: ABNORMAL
CYTOLOGY CVX/VAG DOC THIN PREP: ABNORMAL
DX ICD CODE: ABNORMAL
HPV I/H RISK 4 DNA CVX QL PROBE+SIG AMP: POSITIVE
HPV16 DNA CVX QL PROBE+SIG AMP: NEGATIVE
HPV18+45 E6+E7 MRNA CVX QL NAA+PROBE: NEGATIVE
OTHER STN SPEC: ABNORMAL
SERVICE CMNT-IMP: ABNORMAL
STAT OF ADQ CVX/VAG CYTO-IMP: ABNORMAL

## 2025-05-01 ENCOUNTER — HOSPITAL ENCOUNTER (OUTPATIENT)
Facility: HOSPITAL | Age: 50
Discharge: HOME | End: 2025-05-01
Admitting: STUDENT IN AN ORGANIZED HEALTH CARE EDUCATION/TRAINING PROGRAM
Payer: COMMERCIAL

## 2025-05-01 DIAGNOSIS — Z12.31 SCREENING MAMMOGRAM FOR BREAST CANCER: ICD-10-CM

## 2025-05-01 DIAGNOSIS — Z01.419 WELL WOMAN EXAM WITH ROUTINE GYNECOLOGICAL EXAM: ICD-10-CM

## 2025-05-01 PROCEDURE — 77067 SCR MAMMO BI INCL CAD: CPT

## 2025-05-01 PROCEDURE — 77063 BREAST TOMOSYNTHESIS BI: CPT

## 2025-06-10 DIAGNOSIS — A60.00 HERPES SIMPLEX INFECTION OF GENITOURINARY SYSTEM: ICD-10-CM

## 2025-06-10 RX ORDER — VALACYCLOVIR HYDROCHLORIDE 500 MG/1
500 TABLET, FILM COATED ORAL 2 TIMES DAILY
Qty: 18 TABLET | Refills: 0 | Status: SHIPPED | OUTPATIENT
Start: 2025-06-10

## 2025-06-26 ENCOUNTER — TELEPHONE (OUTPATIENT)
Dept: FAMILY MEDICINE CLINIC | Facility: CLINIC | Age: 50
End: 2025-06-26

## 2025-06-26 DIAGNOSIS — F41.1 GENERALIZED ANXIETY DISORDER: ICD-10-CM

## 2025-06-26 NOTE — TELEPHONE ENCOUNTER
Caller: [facility] #[id number] - [place], [place] - [address] - [phone] [facility] [phone]     Relationship: Pharmacy    Best call back number: [phone]    What was the call regarding: STATED THAT THE PATIENT ASKED FOR A REFILL OF THE sertraline (ZOLOFT) 50 MG tablet FOR JUST ENOUGH TO LAST UNTIL THEY GET BACK TO [place]. STATED THAT THEY ARE IN [place] UNTIL SUNDAY. STATED THAT THEY ARE COMPLETELY OUT OF THE MEDICATION SO THEY NEED TO GET THIS AS SOON AS THEY CAN. PLEASE CALL AND ADVISE

## 2025-07-09 ENCOUNTER — TELEPHONE (OUTPATIENT)
Dept: OBSTETRICS AND GYNECOLOGY | Age: 50
End: 2025-07-09

## 2025-07-09 RX ORDER — PROGESTERONE 100 MG/1
100 CAPSULE ORAL
Qty: 30 CAPSULE | Refills: 3 | Status: CANCELLED | OUTPATIENT
Start: 2025-07-09

## 2025-07-09 RX ORDER — PROGESTERONE 100 MG/1
100 CAPSULE ORAL
Qty: 30 CAPSULE | Refills: 3 | Status: SHIPPED | OUTPATIENT
Start: 2025-07-09

## 2025-07-09 NOTE — TELEPHONE ENCOUNTER
MARCUS and let us know if she wants her progesterone sent to [facility] or [facility] pharmacy.  We have gotten refill requests from both of these pharmacies.

## 2025-08-19 RX ORDER — METHYLPREDNISOLONE 4 MG/1
TABLET ORAL
Qty: 21 TABLET | Refills: 0 | Status: SHIPPED | OUTPATIENT
Start: 2025-08-19

## (undated) DEVICE — ADAPT CLN BIOGUARD AIR/H2O DISP

## (undated) DEVICE — LN SMPL CO2 SHTRM SD STREAM W/M LUER

## (undated) DEVICE — CANN O2 ETCO2 FITS ALL CONN CO2 SMPL A/ 7IN DISP LF

## (undated) DEVICE — SENSR O2 OXIMAX FNGR A/ 18IN NONSTR

## (undated) DEVICE — KT ORCA ORCAPOD DISP STRL

## (undated) DEVICE — TUBING, SUCTION, 1/4" X 10', STRAIGHT: Brand: MEDLINE

## (undated) DEVICE — THE TORRENT IRRIGATION SCOPE CONNECTOR IS USED WITH THE TORRENT IRRIGATION TUBING TO PROVIDE IRRIGATION FLUIDS SUCH AS STERILE WATER DURING GASTROINTESTINAL ENDOSCOPIC PROCEDURES WHEN USED IN CONJUNCTION WITH AN IRRIGATION PUMP (OR ELECTROSURGICAL UNIT).: Brand: TORRENT

## (undated) DEVICE — GOWN SURG AERO CHROME XL